# Patient Record
Sex: FEMALE | Race: WHITE | Employment: OTHER | URBAN - METROPOLITAN AREA
[De-identification: names, ages, dates, MRNs, and addresses within clinical notes are randomized per-mention and may not be internally consistent; named-entity substitution may affect disease eponyms.]

---

## 2017-01-01 ENCOUNTER — HOME CARE VISIT (OUTPATIENT)
Dept: HOSPICE | Facility: HOSPICE | Age: 77
End: 2017-01-01
Payer: MEDICARE

## 2017-01-01 ENCOUNTER — HOSPITAL ENCOUNTER (INPATIENT)
Age: 77
LOS: 3 days | DRG: 872 | End: 2017-01-19
Attending: FAMILY MEDICINE | Admitting: FAMILY MEDICINE
Payer: OTHER MISCELLANEOUS

## 2017-01-01 ENCOUNTER — APPOINTMENT (OUTPATIENT)
Dept: CT IMAGING | Age: 77
DRG: 871 | End: 2017-01-01
Attending: INTERNAL MEDICINE
Payer: MEDICARE

## 2017-01-01 ENCOUNTER — HOSPITAL ENCOUNTER (INPATIENT)
Age: 77
LOS: 3 days | Discharge: HOSPICE | DRG: 871 | End: 2017-01-16
Attending: EMERGENCY MEDICINE | Admitting: INTERNAL MEDICINE
Payer: MEDICARE

## 2017-01-01 ENCOUNTER — HOSPICE CERTIFICATION ENCOUNTER (OUTPATIENT)
Dept: PALLATIVE CARE | Age: 77
End: 2017-01-01

## 2017-01-01 ENCOUNTER — HOSPICE ADMISSION (OUTPATIENT)
Dept: HOSPICE | Facility: HOSPICE | Age: 77
End: 2017-01-01
Payer: MEDICARE

## 2017-01-01 ENCOUNTER — APPOINTMENT (OUTPATIENT)
Dept: CT IMAGING | Age: 77
DRG: 871 | End: 2017-01-01
Attending: EMERGENCY MEDICINE
Payer: MEDICARE

## 2017-01-01 ENCOUNTER — APPOINTMENT (OUTPATIENT)
Dept: GENERAL RADIOLOGY | Age: 77
DRG: 871 | End: 2017-01-01
Attending: EMERGENCY MEDICINE
Payer: MEDICARE

## 2017-01-01 ENCOUNTER — HOSPITAL ENCOUNTER (OUTPATIENT)
Dept: GENERAL RADIOLOGY | Age: 77
Discharge: HOME | End: 2017-01-13
Attending: PHYSICAL MEDICINE & REHABILITATION
Payer: COMMERCIAL

## 2017-01-01 ENCOUNTER — HOSPITAL ENCOUNTER (OUTPATIENT)
Age: 77
Discharge: ANOTHER HOSPITAL | End: 2017-01-13
Attending: PHYSICAL MEDICINE & REHABILITATION | Admitting: PHYSICAL MEDICINE & REHABILITATION

## 2017-01-01 VITALS
TEMPERATURE: 98.7 F | HEART RATE: 100 BPM | SYSTOLIC BLOOD PRESSURE: 58 MMHG | OXYGEN SATURATION: 94 % | DIASTOLIC BLOOD PRESSURE: 42 MMHG | RESPIRATION RATE: 20 BRPM

## 2017-01-01 VITALS
TEMPERATURE: 97.6 F | OXYGEN SATURATION: 100 % | BODY MASS INDEX: 24.9 KG/M2 | RESPIRATION RATE: 20 BRPM | DIASTOLIC BLOOD PRESSURE: 45 MMHG | HEART RATE: 86 BPM | SYSTOLIC BLOOD PRESSURE: 77 MMHG | HEIGHT: 60 IN | WEIGHT: 126.8 LBS

## 2017-01-01 VITALS
DIASTOLIC BLOOD PRESSURE: 72 MMHG | WEIGHT: 107 LBS | HEIGHT: 60 IN | BODY MASS INDEX: 21.01 KG/M2 | SYSTOLIC BLOOD PRESSURE: 112 MMHG | HEART RATE: 100 BPM

## 2017-01-01 DIAGNOSIS — A41.9 SEPSIS, DUE TO UNSPECIFIED ORGANISM: ICD-10-CM

## 2017-01-01 DIAGNOSIS — R53.1 WEAKNESS: ICD-10-CM

## 2017-01-01 DIAGNOSIS — R41.82 ALTERED MENTAL STATUS, UNSPECIFIED: Primary | ICD-10-CM

## 2017-01-01 DIAGNOSIS — R53.81 DEBILITY: ICD-10-CM

## 2017-01-01 LAB
ALBUMIN SERPL BCP-MCNC: 2.5 G/DL (ref 3.5–5)
ALBUMIN SERPL BCP-MCNC: 3.4 G/DL (ref 3.5–5)
ALBUMIN SERPL BCP-MCNC: 3.5 G/DL (ref 3.5–5)
ALBUMIN/GLOB SERPL: 0.7 {RATIO} (ref 1.1–2.2)
ALP SERPL-CCNC: 102 U/L (ref 45–117)
ALP SERPL-CCNC: 179 U/L (ref 45–117)
ALP SERPL-CCNC: 196 U/L (ref 45–117)
ALT SERPL-CCNC: 33 U/L (ref 12–78)
ALT SERPL-CCNC: 54 U/L (ref 12–78)
ALT SERPL-CCNC: 58 U/L (ref 12–78)
ANION GAP BLD CALC-SCNC: 10 MMOL/L (ref 5–15)
ANION GAP BLD CALC-SCNC: 11 MMOL/L (ref 5–15)
ANION GAP BLD CALC-SCNC: 11 MMOL/L (ref 5–15)
ANION GAP BLD CALC-SCNC: 12 MMOL/L (ref 5–15)
ANION GAP BLD CALC-SCNC: 9 MMOL/L (ref 5–15)
APPEARANCE UR: ABNORMAL
AST SERPL W P-5'-P-CCNC: 29 U/L (ref 15–37)
AST SERPL W P-5'-P-CCNC: 33 U/L (ref 15–37)
AST SERPL W P-5'-P-CCNC: 42 U/L (ref 15–37)
ATRIAL RATE: 99 BPM
BACTERIA SPEC CULT: NORMAL
BACTERIA URNS QL MICRO: ABNORMAL /HPF
BASOPHILS # BLD AUTO: 0.1 K/UL (ref 0–0.1)
BASOPHILS # BLD AUTO: 0.1 K/UL (ref 0–0.1)
BASOPHILS # BLD: 0 % (ref 0–1)
BASOPHILS # BLD: 1 % (ref 0–1)
BILIRUB DIRECT SERPL-MCNC: 0.1 MG/DL (ref 0–0.2)
BILIRUB SERPL-MCNC: 0.4 MG/DL (ref 0.2–1)
BILIRUB SERPL-MCNC: 0.5 MG/DL (ref 0.2–1)
BILIRUB SERPL-MCNC: 0.5 MG/DL (ref 0.2–1)
BILIRUB UR QL: NEGATIVE
BNP SERPL-MCNC: 9938 PG/ML (ref 0–450)
BUN SERPL-MCNC: 51 MG/DL (ref 6–20)
BUN SERPL-MCNC: 62 MG/DL (ref 6–20)
BUN SERPL-MCNC: 68 MG/DL (ref 6–20)
BUN SERPL-MCNC: 87 MG/DL (ref 6–20)
BUN SERPL-MCNC: 93 MG/DL (ref 6–20)
BUN/CREAT SERPL: 42 (ref 12–20)
BUN/CREAT SERPL: 48 (ref 12–20)
BUN/CREAT SERPL: 59 (ref 12–20)
BUN/CREAT SERPL: 65 (ref 12–20)
BUN/CREAT SERPL: 69 (ref 12–20)
CALCIUM SERPL-MCNC: 8 MG/DL (ref 8.5–10.1)
CALCIUM SERPL-MCNC: 8.6 MG/DL (ref 8.5–10.1)
CALCIUM SERPL-MCNC: 8.8 MG/DL (ref 8.5–10.1)
CALCIUM SERPL-MCNC: 9.6 MG/DL (ref 8.5–10.1)
CALCIUM SERPL-MCNC: 9.9 MG/DL (ref 8.5–10.1)
CALCULATED P AXIS, ECG09: 63 DEGREES
CALCULATED R AXIS, ECG10: -16 DEGREES
CALCULATED T AXIS, ECG11: 118 DEGREES
CC UR VC: NORMAL
CHLORIDE SERPL-SCNC: 100 MMOL/L (ref 97–108)
CHLORIDE SERPL-SCNC: 103 MMOL/L (ref 97–108)
CHLORIDE SERPL-SCNC: 106 MMOL/L (ref 97–108)
CHLORIDE SERPL-SCNC: 111 MMOL/L (ref 97–108)
CHLORIDE SERPL-SCNC: 112 MMOL/L (ref 97–108)
CO2 SERPL-SCNC: 22 MMOL/L (ref 21–32)
CO2 SERPL-SCNC: 26 MMOL/L (ref 21–32)
CO2 SERPL-SCNC: 27 MMOL/L (ref 21–32)
CO2 SERPL-SCNC: 28 MMOL/L (ref 21–32)
CO2 SERPL-SCNC: 28 MMOL/L (ref 21–32)
COLOR UR: ABNORMAL
CREAT SERPL-MCNC: 1.16 MG/DL (ref 0.55–1.02)
CREAT SERPL-MCNC: 1.21 MG/DL (ref 0.55–1.02)
CREAT SERPL-MCNC: 1.3 MG/DL (ref 0.55–1.02)
CREAT SERPL-MCNC: 1.33 MG/DL (ref 0.55–1.02)
CREAT SERPL-MCNC: 1.35 MG/DL (ref 0.55–1.02)
DIAGNOSIS, 93000: NORMAL
EOSINOPHIL # BLD: 0.1 K/UL (ref 0–0.4)
EOSINOPHIL # BLD: 0.1 K/UL (ref 0–0.4)
EOSINOPHIL NFR BLD: 0 % (ref 0–7)
EOSINOPHIL NFR BLD: 1 % (ref 0–7)
EPITH CASTS URNS QL MICRO: ABNORMAL /LPF
ERYTHROCYTE [DISTWIDTH] IN BLOOD BY AUTOMATED COUNT: 17.5 % (ref 11.5–14.5)
ERYTHROCYTE [DISTWIDTH] IN BLOOD BY AUTOMATED COUNT: 17.5 % (ref 11.5–14.5)
ERYTHROCYTE [DISTWIDTH] IN BLOOD BY AUTOMATED COUNT: 17.6 % (ref 11.5–14.5)
ERYTHROCYTE [DISTWIDTH] IN BLOOD BY AUTOMATED COUNT: 17.6 % (ref 11.5–14.5)
ERYTHROCYTE [DISTWIDTH] IN BLOOD BY AUTOMATED COUNT: 17.8 % (ref 11.5–14.5)
ERYTHROCYTE [DISTWIDTH] IN BLOOD BY AUTOMATED COUNT: 17.8 % (ref 11.5–14.5)
ERYTHROCYTE [SEDIMENTATION RATE] IN BLOOD: 42 MM/HR (ref 0–30)
GLOBULIN SER CALC-MCNC: 3.6 G/DL (ref 2–4)
GLOBULIN SER CALC-MCNC: 4.8 G/DL (ref 2–4)
GLOBULIN SER CALC-MCNC: 5 G/DL (ref 2–4)
GLUCOSE BLD STRIP.AUTO-MCNC: 103 MG/DL (ref 65–100)
GLUCOSE BLD STRIP.AUTO-MCNC: 106 MG/DL (ref 65–100)
GLUCOSE BLD STRIP.AUTO-MCNC: 107 MG/DL (ref 65–100)
GLUCOSE BLD STRIP.AUTO-MCNC: 108 MG/DL (ref 65–100)
GLUCOSE BLD STRIP.AUTO-MCNC: 39 MG/DL (ref 65–100)
GLUCOSE BLD STRIP.AUTO-MCNC: 39 MG/DL (ref 65–100)
GLUCOSE BLD STRIP.AUTO-MCNC: 71 MG/DL (ref 65–100)
GLUCOSE BLD STRIP.AUTO-MCNC: 73 MG/DL (ref 65–100)
GLUCOSE BLD STRIP.AUTO-MCNC: 78 MG/DL (ref 65–100)
GLUCOSE BLD STRIP.AUTO-MCNC: 82 MG/DL (ref 65–100)
GLUCOSE BLD STRIP.AUTO-MCNC: 90 MG/DL (ref 65–100)
GLUCOSE BLD STRIP.AUTO-MCNC: 94 MG/DL (ref 65–100)
GLUCOSE BLD STRIP.AUTO-MCNC: 98 MG/DL (ref 65–100)
GLUCOSE BLD STRIP.AUTO-MCNC: 99 MG/DL (ref 65–100)
GLUCOSE SERPL-MCNC: 252 MG/DL (ref 65–100)
GLUCOSE SERPL-MCNC: 85 MG/DL (ref 65–100)
GLUCOSE SERPL-MCNC: 87 MG/DL (ref 65–100)
GLUCOSE SERPL-MCNC: 90 MG/DL (ref 65–100)
GLUCOSE SERPL-MCNC: 92 MG/DL (ref 65–100)
GLUCOSE UR STRIP.AUTO-MCNC: NEGATIVE MG/DL
HCT VFR BLD AUTO: 33 % (ref 35–47)
HCT VFR BLD AUTO: 35.7 % (ref 35–47)
HCT VFR BLD AUTO: 37 % (ref 35–47)
HCT VFR BLD AUTO: 39.7 % (ref 35–47)
HCT VFR BLD AUTO: 40.6 % (ref 35–47)
HCT VFR BLD AUTO: 40.8 % (ref 35–47)
HGB BLD-MCNC: 10.4 G/DL (ref 11.5–16)
HGB BLD-MCNC: 11 G/DL (ref 11.5–16)
HGB BLD-MCNC: 12 G/DL (ref 11.5–16)
HGB BLD-MCNC: 12.3 G/DL (ref 11.5–16)
HGB BLD-MCNC: 12.3 G/DL (ref 11.5–16)
HGB BLD-MCNC: 9.8 G/DL (ref 11.5–16)
HGB UR QL STRIP: NEGATIVE
KETONES UR QL STRIP.AUTO: NEGATIVE MG/DL
LACTATE SERPL-SCNC: 1.5 MMOL/L (ref 0.4–2)
LACTATE SERPL-SCNC: 2 MMOL/L (ref 0.4–2)
LACTATE SERPL-SCNC: 2 MMOL/L (ref 0.4–2)
LACTATE SERPL-SCNC: 2.3 MMOL/L (ref 0.4–2)
LACTATE SERPL-SCNC: 2.3 MMOL/L (ref 0.4–2)
LACTATE SERPL-SCNC: 2.4 MMOL/L (ref 0.4–2)
LACTATE SERPL-SCNC: 3.3 MMOL/L (ref 0.4–2)
LEUKOCYTE ESTERASE UR QL STRIP.AUTO: ABNORMAL
LYMPHOCYTES # BLD AUTO: 12 % (ref 12–49)
LYMPHOCYTES # BLD AUTO: 9 % (ref 12–49)
LYMPHOCYTES # BLD: 1.5 K/UL (ref 0.8–3.5)
LYMPHOCYTES # BLD: 1.9 K/UL (ref 0.8–3.5)
MAGNESIUM SERPL-MCNC: 1.9 MG/DL (ref 1.6–2.4)
MAGNESIUM SERPL-MCNC: 2.2 MG/DL (ref 1.6–2.4)
MAGNESIUM SERPL-MCNC: 2.2 MG/DL (ref 1.6–2.4)
MAGNESIUM SERPL-MCNC: 2.8 MG/DL (ref 1.6–2.4)
MCH RBC QN AUTO: 25.8 PG (ref 26–34)
MCH RBC QN AUTO: 26 PG (ref 26–34)
MCH RBC QN AUTO: 26.3 PG (ref 26–34)
MCH RBC QN AUTO: 26.4 PG (ref 26–34)
MCH RBC QN AUTO: 26.5 PG (ref 26–34)
MCH RBC QN AUTO: 26.6 PG (ref 26–34)
MCHC RBC AUTO-ENTMCNC: 29.1 G/DL (ref 30–36.5)
MCHC RBC AUTO-ENTMCNC: 29.7 G/DL (ref 30–36.5)
MCHC RBC AUTO-ENTMCNC: 29.7 G/DL (ref 30–36.5)
MCHC RBC AUTO-ENTMCNC: 30.1 G/DL (ref 30–36.5)
MCHC RBC AUTO-ENTMCNC: 30.2 G/DL (ref 30–36.5)
MCHC RBC AUTO-ENTMCNC: 30.3 G/DL (ref 30–36.5)
MCV RBC AUTO: 87.1 FL (ref 80–99)
MCV RBC AUTO: 87.5 FL (ref 80–99)
MCV RBC AUTO: 87.9 FL (ref 80–99)
MCV RBC AUTO: 87.9 FL (ref 80–99)
MCV RBC AUTO: 88.6 FL (ref 80–99)
MCV RBC AUTO: 88.9 FL (ref 80–99)
MONOCYTES # BLD: 1.3 K/UL (ref 0–1)
MONOCYTES # BLD: 1.4 K/UL (ref 0–1)
MONOCYTES NFR BLD AUTO: 8 % (ref 5–13)
MONOCYTES NFR BLD AUTO: 8 % (ref 5–13)
NEUTS SEG # BLD: 12.9 K/UL (ref 1.8–8)
NEUTS SEG # BLD: 14.1 K/UL (ref 1.8–8)
NEUTS SEG NFR BLD AUTO: 79 % (ref 32–75)
NEUTS SEG NFR BLD AUTO: 82 % (ref 32–75)
NITRITE UR QL STRIP.AUTO: NEGATIVE
P-R INTERVAL, ECG05: 136 MS
PH UR STRIP: 7 [PH] (ref 5–8)
PHOSPHATE SERPL-MCNC: 3.5 MG/DL (ref 2.6–4.7)
PHOSPHATE SERPL-MCNC: 4.2 MG/DL (ref 2.6–4.7)
PHOSPHATE SERPL-MCNC: 4.8 MG/DL (ref 2.6–4.7)
PLATELET # BLD AUTO: 622 K/UL (ref 150–400)
PLATELET # BLD AUTO: 676 K/UL (ref 150–400)
PLATELET # BLD AUTO: 718 K/UL (ref 150–400)
PLATELET # BLD AUTO: 805 K/UL (ref 150–400)
PLATELET # BLD AUTO: 805 K/UL (ref 150–400)
PLATELET # BLD AUTO: 854 K/UL (ref 150–400)
POTASSIUM SERPL-SCNC: 2.6 MMOL/L (ref 3.5–5.1)
POTASSIUM SERPL-SCNC: 2.9 MMOL/L (ref 3.5–5.1)
POTASSIUM SERPL-SCNC: 3.2 MMOL/L (ref 3.5–5.1)
POTASSIUM SERPL-SCNC: 4.8 MMOL/L (ref 3.5–5.1)
POTASSIUM SERPL-SCNC: 6 MMOL/L (ref 3.5–5.1)
POTASSIUM SERPL-SCNC: 6.7 MMOL/L (ref 3.5–5.1)
PROT SERPL-MCNC: 6.1 G/DL (ref 6.4–8.2)
PROT SERPL-MCNC: 8.2 G/DL (ref 6.4–8.2)
PROT SERPL-MCNC: 8.5 G/DL (ref 6.4–8.2)
PROT UR STRIP-MCNC: NEGATIVE MG/DL
Q-T INTERVAL, ECG07: 344 MS
QRS DURATION, ECG06: 74 MS
QTC CALCULATION (BEZET), ECG08: 441 MS
RBC # BLD AUTO: 3.77 M/UL (ref 3.8–5.2)
RBC # BLD AUTO: 4.03 M/UL (ref 3.8–5.2)
RBC # BLD AUTO: 4.16 M/UL (ref 3.8–5.2)
RBC # BLD AUTO: 4.56 M/UL (ref 3.8–5.2)
RBC # BLD AUTO: 4.62 M/UL (ref 3.8–5.2)
RBC # BLD AUTO: 4.64 M/UL (ref 3.8–5.2)
RBC #/AREA URNS HPF: ABNORMAL /HPF (ref 0–5)
SERVICE CMNT-IMP: ABNORMAL
SERVICE CMNT-IMP: NORMAL
SODIUM SERPL-SCNC: 137 MMOL/L (ref 136–145)
SODIUM SERPL-SCNC: 139 MMOL/L (ref 136–145)
SODIUM SERPL-SCNC: 140 MMOL/L (ref 136–145)
SODIUM SERPL-SCNC: 148 MMOL/L (ref 136–145)
SODIUM SERPL-SCNC: 152 MMOL/L (ref 136–145)
SP GR UR REFRACTOMETRY: 1.02 (ref 1–1.03)
UROBILINOGEN UR QL STRIP.AUTO: 0.2 EU/DL (ref 0.2–1)
VENTRICULAR RATE, ECG03: 99 BPM
WBC # BLD AUTO: 13.4 K/UL (ref 3.6–11)
WBC # BLD AUTO: 14 K/UL (ref 3.6–11)
WBC # BLD AUTO: 16.4 K/UL (ref 3.6–11)
WBC # BLD AUTO: 17 K/UL (ref 3.6–11)
WBC # BLD AUTO: 18.8 K/UL (ref 3.6–11)
WBC # BLD AUTO: 18.9 K/UL (ref 3.6–11)
WBC URNS QL MICRO: ABNORMAL /HPF (ref 0–4)
YEAST BUDDING URNS QL: PRESENT

## 2017-01-01 PROCEDURE — G0299 HHS/HOSPICE OF RN EA 15 MIN: HCPCS

## 2017-01-01 PROCEDURE — 83735 ASSAY OF MAGNESIUM: CPT | Performed by: PHYSICAL MEDICINE & REHABILITATION

## 2017-01-01 PROCEDURE — 77010033678 HC OXYGEN DAILY

## 2017-01-01 PROCEDURE — 74011250637 HC RX REV CODE- 250/637: Performed by: PHYSICAL MEDICINE & REHABILITATION

## 2017-01-01 PROCEDURE — 74011636637 HC RX REV CODE- 636/637: Performed by: INTERNAL MEDICINE

## 2017-01-01 PROCEDURE — 85652 RBC SED RATE AUTOMATED: CPT | Performed by: PHYSICAL MEDICINE & REHABILITATION

## 2017-01-01 PROCEDURE — 83605 ASSAY OF LACTIC ACID: CPT | Performed by: INTERNAL MEDICINE

## 2017-01-01 PROCEDURE — 87040 BLOOD CULTURE FOR BACTERIA: CPT | Performed by: PHYSICAL MEDICINE & REHABILITATION

## 2017-01-01 PROCEDURE — 74011250637 HC RX REV CODE- 250/637: Performed by: NURSE PRACTITIONER

## 2017-01-01 PROCEDURE — 0651 HSPC ROUTINE HOME CARE

## 2017-01-01 PROCEDURE — 74011250637 HC RX REV CODE- 250/637: Performed by: INTERNAL MEDICINE

## 2017-01-01 PROCEDURE — 71020 XR CHEST PA LAT: CPT

## 2017-01-01 PROCEDURE — 97530 THERAPEUTIC ACTIVITIES: CPT

## 2017-01-01 PROCEDURE — 81001 URINALYSIS AUTO W/SCOPE: CPT | Performed by: PHYSICAL MEDICINE & REHABILITATION

## 2017-01-01 PROCEDURE — 74011250636 HC RX REV CODE- 250/636: Performed by: INTERNAL MEDICINE

## 2017-01-01 PROCEDURE — 80053 COMPREHEN METABOLIC PANEL: CPT | Performed by: PHYSICAL MEDICINE & REHABILITATION

## 2017-01-01 PROCEDURE — 83605 ASSAY OF LACTIC ACID: CPT | Performed by: PHYSICAL MEDICINE & REHABILITATION

## 2017-01-01 PROCEDURE — 70450 CT HEAD/BRAIN W/O DYE: CPT

## 2017-01-01 PROCEDURE — 97163 PT EVAL HIGH COMPLEX 45 MIN: CPT

## 2017-01-01 PROCEDURE — 80053 COMPREHEN METABOLIC PANEL: CPT | Performed by: EMERGENCY MEDICINE

## 2017-01-01 PROCEDURE — 74011000258 HC RX REV CODE- 258: Performed by: EMERGENCY MEDICINE

## 2017-01-01 PROCEDURE — 36415 COLL VENOUS BLD VENIPUNCTURE: CPT | Performed by: INTERNAL MEDICINE

## 2017-01-01 PROCEDURE — 74011250636 HC RX REV CODE- 250/636: Performed by: NURSE PRACTITIONER

## 2017-01-01 PROCEDURE — 99285 EMERGENCY DEPT VISIT HI MDM: CPT

## 2017-01-01 PROCEDURE — 3336500001 HSPC ELECTION

## 2017-01-01 PROCEDURE — 83605 ASSAY OF LACTIC ACID: CPT | Performed by: EMERGENCY MEDICINE

## 2017-01-01 PROCEDURE — 82962 GLUCOSE BLOOD TEST: CPT

## 2017-01-01 PROCEDURE — 74011000250 HC RX REV CODE- 250: Performed by: INTERNAL MEDICINE

## 2017-01-01 PROCEDURE — 84100 ASSAY OF PHOSPHORUS: CPT | Performed by: INTERNAL MEDICINE

## 2017-01-01 PROCEDURE — 83735 ASSAY OF MAGNESIUM: CPT | Performed by: INTERNAL MEDICINE

## 2017-01-01 PROCEDURE — 87077 CULTURE AEROBIC IDENTIFY: CPT | Performed by: PHYSICAL MEDICINE & REHABILITATION

## 2017-01-01 PROCEDURE — 65270000029 HC RM PRIVATE

## 2017-01-01 PROCEDURE — 97116 GAIT TRAINING THERAPY: CPT

## 2017-01-01 PROCEDURE — 85027 COMPLETE CBC AUTOMATED: CPT | Performed by: INTERNAL MEDICINE

## 2017-01-01 PROCEDURE — 97110 THERAPEUTIC EXERCISES: CPT

## 2017-01-01 PROCEDURE — 74011636637 HC RX REV CODE- 636/637: Performed by: PHYSICAL MEDICINE & REHABILITATION

## 2017-01-01 PROCEDURE — 97110 THERAPEUTIC EXERCISES: CPT | Performed by: OCCUPATIONAL THERAPIST

## 2017-01-01 PROCEDURE — 36415 COLL VENOUS BLD VENIPUNCTURE: CPT | Performed by: PHYSICAL MEDICINE & REHABILITATION

## 2017-01-01 PROCEDURE — 74011000250 HC RX REV CODE- 250: Performed by: NURSE PRACTITIONER

## 2017-01-01 PROCEDURE — 97535 SELF CARE MNGMENT TRAINING: CPT | Performed by: OCCUPATIONAL THERAPIST

## 2017-01-01 PROCEDURE — 97165 OT EVAL LOW COMPLEX 30 MIN: CPT | Performed by: OCCUPATIONAL THERAPIST

## 2017-01-01 PROCEDURE — 85027 COMPLETE CBC AUTOMATED: CPT | Performed by: PHYSICAL MEDICINE & REHABILITATION

## 2017-01-01 PROCEDURE — 77030034848

## 2017-01-01 PROCEDURE — 74011000258 HC RX REV CODE- 258: Performed by: INTERNAL MEDICINE

## 2017-01-01 PROCEDURE — 76450000000

## 2017-01-01 PROCEDURE — 51702 INSERT TEMP BLADDER CATH: CPT

## 2017-01-01 PROCEDURE — 80048 BASIC METABOLIC PNL TOTAL CA: CPT | Performed by: INTERNAL MEDICINE

## 2017-01-01 PROCEDURE — 74011636320 HC RX REV CODE- 636/320: Performed by: RADIOLOGY

## 2017-01-01 PROCEDURE — A9270 NON-COVERED ITEM OR SERVICE: HCPCS | Performed by: INTERNAL MEDICINE

## 2017-01-01 PROCEDURE — 80076 HEPATIC FUNCTION PANEL: CPT | Performed by: INTERNAL MEDICINE

## 2017-01-01 PROCEDURE — 74176 CT ABD & PELVIS W/O CONTRAST: CPT

## 2017-01-01 PROCEDURE — 85025 COMPLETE CBC W/AUTO DIFF WBC: CPT | Performed by: PHYSICAL MEDICINE & REHABILITATION

## 2017-01-01 PROCEDURE — 83880 ASSAY OF NATRIURETIC PEPTIDE: CPT | Performed by: PHYSICAL MEDICINE & REHABILITATION

## 2017-01-01 PROCEDURE — 77030027138 HC INCENT SPIROMETER -A

## 2017-01-01 PROCEDURE — 65660000000 HC RM CCU STEPDOWN

## 2017-01-01 PROCEDURE — 96360 HYDRATION IV INFUSION INIT: CPT

## 2017-01-01 PROCEDURE — 74011250636 HC RX REV CODE- 250/636: Performed by: EMERGENCY MEDICINE

## 2017-01-01 PROCEDURE — 87186 SC STD MICRODIL/AGAR DIL: CPT | Performed by: PHYSICAL MEDICINE & REHABILITATION

## 2017-01-01 PROCEDURE — 36600 WITHDRAWAL OF ARTERIAL BLOOD: CPT

## 2017-01-01 PROCEDURE — 84132 ASSAY OF SERUM POTASSIUM: CPT | Performed by: INTERNAL MEDICINE

## 2017-01-01 PROCEDURE — 87086 URINE CULTURE/COLONY COUNT: CPT | Performed by: PHYSICAL MEDICINE & REHABILITATION

## 2017-01-01 PROCEDURE — 93306 TTE W/DOPPLER COMPLETE: CPT

## 2017-01-01 PROCEDURE — 77030012856

## 2017-01-01 PROCEDURE — 96361 HYDRATE IV INFUSION ADD-ON: CPT

## 2017-01-01 PROCEDURE — 93005 ELECTROCARDIOGRAM TRACING: CPT

## 2017-01-01 PROCEDURE — L4396 STATIC OR DYNAMI AFO PRE CST: HCPCS

## 2017-01-01 RX ORDER — ESCITALOPRAM OXALATE 10 MG/1
20 TABLET ORAL DAILY
Status: DISCONTINUED | OUTPATIENT
Start: 2017-01-01 | End: 2017-01-01 | Stop reason: HOSPADM

## 2017-01-01 RX ORDER — SODIUM CHLORIDE 0.9 % (FLUSH) 0.9 %
5-10 SYRINGE (ML) INJECTION AS NEEDED
Status: DISCONTINUED | OUTPATIENT
Start: 2017-01-01 | End: 2017-01-01 | Stop reason: HOSPADM

## 2017-01-01 RX ORDER — MORPHINE SULFATE 20 MG/ML
5 SOLUTION ORAL
Status: DISCONTINUED | OUTPATIENT
Start: 2017-01-01 | End: 2017-01-01 | Stop reason: HOSPADM

## 2017-01-01 RX ORDER — FLUTICASONE PROPIONATE 50 MCG
2 SPRAY, SUSPENSION (ML) NASAL DAILY
Status: DISCONTINUED | OUTPATIENT
Start: 2017-01-01 | End: 2017-01-01 | Stop reason: HOSPADM

## 2017-01-01 RX ORDER — ESCITALOPRAM OXALATE 10 MG/1
20 TABLET ORAL DAILY
Status: DISCONTINUED | OUTPATIENT
Start: 2017-01-01 | End: 2017-01-01

## 2017-01-01 RX ORDER — ASPIRIN 81 MG/1
81 TABLET ORAL DAILY
Status: DISCONTINUED | OUTPATIENT
Start: 2017-01-01 | End: 2017-01-01

## 2017-01-01 RX ORDER — GUAIFENESIN 100 MG/5ML
81 LIQUID (ML) ORAL DAILY
Status: DISCONTINUED | OUTPATIENT
Start: 2017-01-01 | End: 2017-01-01 | Stop reason: HOSPADM

## 2017-01-01 RX ORDER — PANTOPRAZOLE SODIUM 40 MG/1
40 TABLET, DELAYED RELEASE ORAL
COMMUNITY
End: 2017-01-01

## 2017-01-01 RX ORDER — ACETAMINOPHEN 325 MG/1
650 TABLET ORAL
COMMUNITY

## 2017-01-01 RX ORDER — LORAZEPAM 2 MG/ML
0.5 INJECTION INTRAMUSCULAR
Status: DISCONTINUED | OUTPATIENT
Start: 2017-01-01 | End: 2017-01-01

## 2017-01-01 RX ORDER — OXYCODONE AND ACETAMINOPHEN 5; 325 MG/1; MG/1
2 TABLET ORAL
Status: DISCONTINUED | OUTPATIENT
Start: 2017-01-01 | End: 2017-01-01

## 2017-01-01 RX ORDER — LINEZOLID 2 MG/ML
600 INJECTION, SOLUTION INTRAVENOUS EVERY 12 HOURS
Status: DISCONTINUED | OUTPATIENT
Start: 2017-01-01 | End: 2017-01-01

## 2017-01-01 RX ORDER — POTASSIUM CHLORIDE 750 MG/1
40 TABLET, FILM COATED, EXTENDED RELEASE ORAL 3 TIMES DAILY
Status: DISCONTINUED | OUTPATIENT
Start: 2017-01-01 | End: 2017-01-01

## 2017-01-01 RX ORDER — DEXTROSE AND POTASSIUM CHLORIDE 5; .15 G/100ML; G/100ML
SOLUTION INTRAVENOUS CONTINUOUS
Status: DISCONTINUED | OUTPATIENT
Start: 2017-01-01 | End: 2017-01-01

## 2017-01-01 RX ORDER — SIMVASTATIN 20 MG/1
20 TABLET, FILM COATED ORAL
Status: DISCONTINUED | OUTPATIENT
Start: 2017-01-01 | End: 2017-01-01 | Stop reason: HOSPADM

## 2017-01-01 RX ORDER — IPRATROPIUM BROMIDE AND ALBUTEROL SULFATE 2.5; .5 MG/3ML; MG/3ML
3 SOLUTION RESPIRATORY (INHALATION)
Status: DISCONTINUED | OUTPATIENT
Start: 2017-01-01 | End: 2017-01-01 | Stop reason: HOSPADM

## 2017-01-01 RX ORDER — SODIUM CHLORIDE 9 MG/ML
50 INJECTION, SOLUTION INTRAVENOUS CONTINUOUS
Status: DISCONTINUED | OUTPATIENT
Start: 2017-01-01 | End: 2017-01-01

## 2017-01-01 RX ORDER — POTASSIUM CHLORIDE 750 MG/1
40 TABLET, FILM COATED, EXTENDED RELEASE ORAL ONCE
Status: COMPLETED | OUTPATIENT
Start: 2017-01-01 | End: 2017-01-01

## 2017-01-01 RX ORDER — DEXTROSE MONOHYDRATE AND SODIUM CHLORIDE 5; .9 G/100ML; G/100ML
75 INJECTION, SOLUTION INTRAVENOUS CONTINUOUS
Status: DISCONTINUED | OUTPATIENT
Start: 2017-01-01 | End: 2017-01-01

## 2017-01-01 RX ORDER — HYDROMORPHONE HYDROCHLORIDE 1 MG/ML
0.5 INJECTION, SOLUTION INTRAMUSCULAR; INTRAVENOUS; SUBCUTANEOUS
Status: DISCONTINUED | OUTPATIENT
Start: 2017-01-01 | End: 2017-01-01 | Stop reason: HOSPADM

## 2017-01-01 RX ORDER — METHYLPHENIDATE HYDROCHLORIDE 5 MG/1
10 TABLET ORAL DAILY
Status: DISCONTINUED | OUTPATIENT
Start: 2017-01-01 | End: 2017-01-01

## 2017-01-01 RX ORDER — ONDANSETRON 2 MG/ML
4 INJECTION INTRAMUSCULAR; INTRAVENOUS
Status: DISCONTINUED | OUTPATIENT
Start: 2017-01-01 | End: 2017-01-01 | Stop reason: HOSPADM

## 2017-01-01 RX ORDER — FACIAL-BODY WIPES
10 EACH TOPICAL DAILY PRN
Status: DISCONTINUED | OUTPATIENT
Start: 2017-01-01 | End: 2017-01-01 | Stop reason: HOSPADM

## 2017-01-01 RX ORDER — METOLAZONE 5 MG/1
5 TABLET ORAL
Status: DISCONTINUED | OUTPATIENT
Start: 2017-01-01 | End: 2017-01-01 | Stop reason: HOSPADM

## 2017-01-01 RX ORDER — HEPARIN SODIUM 5000 [USP'U]/ML
5000 INJECTION, SOLUTION INTRAVENOUS; SUBCUTANEOUS EVERY 8 HOURS
Status: DISCONTINUED | OUTPATIENT
Start: 2017-01-01 | End: 2017-01-01

## 2017-01-01 RX ORDER — LEVOTHYROXINE SODIUM 50 UG/1
50 TABLET ORAL
Status: DISCONTINUED | OUTPATIENT
Start: 2017-01-01 | End: 2017-01-01 | Stop reason: HOSPADM

## 2017-01-01 RX ORDER — NYSTATIN 100000 U/G
OINTMENT TOPICAL 3 TIMES DAILY
Status: DISCONTINUED | OUTPATIENT
Start: 2017-01-01 | End: 2017-01-01 | Stop reason: HOSPADM

## 2017-01-01 RX ORDER — SODIUM CHLORIDE 0.9 % (FLUSH) 0.9 %
5-10 SYRINGE (ML) INJECTION EVERY 8 HOURS
Status: DISCONTINUED | OUTPATIENT
Start: 2017-01-01 | End: 2017-01-01 | Stop reason: HOSPADM

## 2017-01-01 RX ORDER — INSULIN LISPRO 100 [IU]/ML
INJECTION, SOLUTION INTRAVENOUS; SUBCUTANEOUS
Status: DISCONTINUED | OUTPATIENT
Start: 2017-01-01 | End: 2017-01-01 | Stop reason: HOSPADM

## 2017-01-01 RX ORDER — ACETAMINOPHEN 650 MG/1
650 SUPPOSITORY RECTAL
Status: DISCONTINUED | OUTPATIENT
Start: 2017-01-01 | End: 2017-01-01 | Stop reason: HOSPADM

## 2017-01-01 RX ORDER — LEVOTHYROXINE SODIUM 50 UG/1
50 TABLET ORAL
Status: DISCONTINUED | OUTPATIENT
Start: 2017-01-01 | End: 2017-01-01

## 2017-01-01 RX ORDER — NITROGLYCERIN 0.4 MG/1
0.4 TABLET SUBLINGUAL
Status: DISCONTINUED | OUTPATIENT
Start: 2017-01-01 | End: 2017-01-01 | Stop reason: HOSPADM

## 2017-01-01 RX ORDER — MAGNESIUM SULFATE 100 %
16 CRYSTALS MISCELLANEOUS AS NEEDED
Status: DISCONTINUED | OUTPATIENT
Start: 2017-01-01 | End: 2017-01-01 | Stop reason: HOSPADM

## 2017-01-01 RX ORDER — MORPHINE SULFATE 20 MG/ML
5 SOLUTION ORAL EVERY 4 HOURS
Status: DISCONTINUED | OUTPATIENT
Start: 2017-01-01 | End: 2017-01-01 | Stop reason: HOSPADM

## 2017-01-01 RX ORDER — OXYCODONE AND ACETAMINOPHEN 5; 325 MG/1; MG/1
1 TABLET ORAL
Status: DISCONTINUED | OUTPATIENT
Start: 2017-01-01 | End: 2017-01-01

## 2017-01-01 RX ORDER — LORAZEPAM 2 MG/ML
0.5 CONCENTRATE ORAL
Status: DISCONTINUED | OUTPATIENT
Start: 2017-01-01 | End: 2017-01-01 | Stop reason: HOSPADM

## 2017-01-01 RX ORDER — POTASSIUM CHLORIDE 1.5 G/1.77G
20 POWDER, FOR SOLUTION ORAL
Status: COMPLETED | OUTPATIENT
Start: 2017-01-01 | End: 2017-01-01

## 2017-01-01 RX ORDER — DEXTROSE 50 % IN WATER (D50W) INTRAVENOUS SYRINGE
25 AS NEEDED
Status: DISCONTINUED | OUTPATIENT
Start: 2017-01-01 | End: 2017-01-01 | Stop reason: HOSPADM

## 2017-01-01 RX ORDER — SIMVASTATIN 20 MG/1
20 TABLET, FILM COATED ORAL
Status: DISCONTINUED | OUTPATIENT
Start: 2017-01-01 | End: 2017-01-01

## 2017-01-01 RX ORDER — METHYLPHENIDATE HYDROCHLORIDE 5 MG/1
10 TABLET ORAL DAILY
Status: DISCONTINUED | OUTPATIENT
Start: 2017-01-01 | End: 2017-01-01 | Stop reason: HOSPADM

## 2017-01-01 RX ORDER — THERA TABS 400 MCG
1 TAB ORAL DAILY
Status: DISCONTINUED | OUTPATIENT
Start: 2017-01-01 | End: 2017-01-01 | Stop reason: HOSPADM

## 2017-01-01 RX ORDER — SPIRONOLACTONE 25 MG/1
50 TABLET ORAL DAILY
Status: DISCONTINUED | OUTPATIENT
Start: 2017-01-01 | End: 2017-01-01 | Stop reason: HOSPADM

## 2017-01-01 RX ORDER — ACETAMINOPHEN 325 MG/1
650 TABLET ORAL
Status: DISCONTINUED | OUTPATIENT
Start: 2017-01-01 | End: 2017-01-01 | Stop reason: HOSPADM

## 2017-01-01 RX ORDER — OMEPRAZOLE 20 MG/1
20 CAPSULE, DELAYED RELEASE ORAL
COMMUNITY

## 2017-01-01 RX ORDER — ADHESIVE BANDAGE
30 BANDAGE TOPICAL DAILY PRN
Status: DISCONTINUED | OUTPATIENT
Start: 2017-01-01 | End: 2017-01-01 | Stop reason: HOSPADM

## 2017-01-01 RX ORDER — OXYCODONE AND ACETAMINOPHEN 5; 325 MG/1; MG/1
1 TABLET ORAL
Status: DISCONTINUED | OUTPATIENT
Start: 2017-01-01 | End: 2017-01-01 | Stop reason: HOSPADM

## 2017-01-01 RX ORDER — THERA TABS 400 MCG
1 TAB ORAL DAILY
Status: DISCONTINUED | OUTPATIENT
Start: 2017-01-01 | End: 2017-01-01

## 2017-01-01 RX ORDER — HYDROMORPHONE HYDROCHLORIDE 1 MG/ML
1 INJECTION, SOLUTION INTRAMUSCULAR; INTRAVENOUS; SUBCUTANEOUS ONCE
Status: COMPLETED | OUTPATIENT
Start: 2017-01-01 | End: 2017-01-01

## 2017-01-01 RX ORDER — PANTOPRAZOLE SODIUM 40 MG/1
40 TABLET, DELAYED RELEASE ORAL
Status: DISCONTINUED | OUTPATIENT
Start: 2017-01-01 | End: 2017-01-01 | Stop reason: HOSPADM

## 2017-01-01 RX ORDER — HYDROMORPHONE HYDROCHLORIDE 2 MG/ML
0.5 INJECTION, SOLUTION INTRAMUSCULAR; INTRAVENOUS; SUBCUTANEOUS
Status: DISCONTINUED | OUTPATIENT
Start: 2017-01-01 | End: 2017-01-01

## 2017-01-01 RX ORDER — SODIUM CHLORIDE 0.9 % (FLUSH) 0.9 %
5 SYRINGE (ML) INJECTION AS NEEDED
Status: DISCONTINUED | OUTPATIENT
Start: 2017-01-01 | End: 2017-01-01

## 2017-01-01 RX ORDER — HYDROMORPHONE HYDROCHLORIDE 1 MG/ML
0.5 INJECTION, SOLUTION INTRAMUSCULAR; INTRAVENOUS; SUBCUTANEOUS
Status: DISCONTINUED | OUTPATIENT
Start: 2017-01-01 | End: 2017-01-01

## 2017-01-01 RX ORDER — NYSTATIN 100000 U/G
CREAM TOPICAL 3 TIMES DAILY
Status: DISCONTINUED | OUTPATIENT
Start: 2017-01-01 | End: 2017-01-01 | Stop reason: HOSPADM

## 2017-01-01 RX ORDER — AMOXICILLIN 250 MG
1 CAPSULE ORAL 2 TIMES DAILY
Status: DISCONTINUED | OUTPATIENT
Start: 2017-01-01 | End: 2017-01-01

## 2017-01-01 RX ORDER — ATROPINE SULFATE 10 MG/ML
3 SOLUTION/ DROPS OPHTHALMIC
Status: DISCONTINUED | OUTPATIENT
Start: 2017-01-01 | End: 2017-01-01 | Stop reason: HOSPADM

## 2017-01-01 RX ORDER — LORAZEPAM 2 MG/ML
0.5 CONCENTRATE ORAL EVERY 4 HOURS
Status: DISCONTINUED | OUTPATIENT
Start: 2017-01-01 | End: 2017-01-01 | Stop reason: HOSPADM

## 2017-01-01 RX ORDER — DEXTROSE, SODIUM CHLORIDE, AND POTASSIUM CHLORIDE 5; .45; .15 G/100ML; G/100ML; G/100ML
75 INJECTION INTRAVENOUS CONTINUOUS
Status: DISCONTINUED | OUTPATIENT
Start: 2017-01-01 | End: 2017-01-01

## 2017-01-01 RX ORDER — MIDODRINE HYDROCHLORIDE 5 MG/1
10 TABLET ORAL 3 TIMES DAILY
Status: DISCONTINUED | OUTPATIENT
Start: 2017-01-01 | End: 2017-01-01 | Stop reason: HOSPADM

## 2017-01-01 RX ORDER — BUMETANIDE 1 MG/1
2 TABLET ORAL 2 TIMES DAILY
Status: DISCONTINUED | OUTPATIENT
Start: 2017-01-01 | End: 2017-01-01 | Stop reason: HOSPADM

## 2017-01-01 RX ORDER — OXYCODONE AND ACETAMINOPHEN 5; 325 MG/1; MG/1
1-2 TABLET ORAL
Status: DISCONTINUED | OUTPATIENT
Start: 2017-01-01 | End: 2017-01-01 | Stop reason: SDUPTHER

## 2017-01-01 RX ORDER — HYDROMORPHONE HYDROCHLORIDE 2 MG/ML
1 INJECTION, SOLUTION INTRAMUSCULAR; INTRAVENOUS; SUBCUTANEOUS
Status: DISCONTINUED | OUTPATIENT
Start: 2017-01-01 | End: 2017-01-01

## 2017-01-01 RX ORDER — INSULIN GLARGINE 100 [IU]/ML
15 INJECTION, SOLUTION SUBCUTANEOUS DAILY
Status: DISCONTINUED | OUTPATIENT
Start: 2017-01-01 | End: 2017-01-01

## 2017-01-01 RX ORDER — HYDROMORPHONE HYDROCHLORIDE 2 MG/ML
0.2 INJECTION, SOLUTION INTRAMUSCULAR; INTRAVENOUS; SUBCUTANEOUS
Status: DISCONTINUED | OUTPATIENT
Start: 2017-01-01 | End: 2017-01-01 | Stop reason: HOSPADM

## 2017-01-01 RX ORDER — MIDODRINE HYDROCHLORIDE 5 MG/1
10 TABLET ORAL 3 TIMES DAILY
Status: DISCONTINUED | OUTPATIENT
Start: 2017-01-01 | End: 2017-01-01

## 2017-01-01 RX ORDER — ENOXAPARIN SODIUM 100 MG/ML
40 INJECTION SUBCUTANEOUS DAILY
Status: DISCONTINUED | OUTPATIENT
Start: 2017-01-01 | End: 2017-01-01 | Stop reason: HOSPADM

## 2017-01-01 RX ORDER — INSULIN LISPRO 100 [IU]/ML
INJECTION, SOLUTION INTRAVENOUS; SUBCUTANEOUS 4 TIMES DAILY
COMMUNITY

## 2017-01-01 RX ORDER — POTASSIUM CHLORIDE 1.5 G/1.77G
20 POWDER, FOR SOLUTION ORAL 2 TIMES DAILY WITH MEALS
Status: COMPLETED | OUTPATIENT
Start: 2017-01-01 | End: 2017-01-01

## 2017-01-01 RX ORDER — METRONIDAZOLE 500 MG/100ML
500 INJECTION, SOLUTION INTRAVENOUS EVERY 6 HOURS
Status: DISCONTINUED | OUTPATIENT
Start: 2017-01-01 | End: 2017-01-01

## 2017-01-01 RX ORDER — DEXTROSE 50 % IN WATER (D50W) INTRAVENOUS SYRINGE
25 ONCE
Status: COMPLETED | OUTPATIENT
Start: 2017-01-01 | End: 2017-01-01

## 2017-01-01 RX ORDER — LORAZEPAM 2 MG/ML
1 INJECTION INTRAMUSCULAR
Status: DISCONTINUED | OUTPATIENT
Start: 2017-01-01 | End: 2017-01-01 | Stop reason: HOSPADM

## 2017-01-01 RX ORDER — OXYCODONE AND ACETAMINOPHEN 5; 325 MG/1; MG/1
2 TABLET ORAL
Status: DISCONTINUED | OUTPATIENT
Start: 2017-01-01 | End: 2017-01-01 | Stop reason: HOSPADM

## 2017-01-01 RX ORDER — AMOXICILLIN 250 MG
1 CAPSULE ORAL 2 TIMES DAILY
Status: DISCONTINUED | OUTPATIENT
Start: 2017-01-01 | End: 2017-01-01 | Stop reason: HOSPADM

## 2017-01-01 RX ADMIN — LORAZEPAM 0.5 MG: 2 SOLUTION, CONCENTRATE ORAL at 22:07

## 2017-01-01 RX ADMIN — LINEZOLID 600 MG: 600 INJECTION, SOLUTION INTRAVENOUS at 11:17

## 2017-01-01 RX ADMIN — OXYCODONE HYDROCHLORIDE AND ACETAMINOPHEN 1 TABLET: 5; 325 TABLET ORAL at 13:16

## 2017-01-01 RX ADMIN — NYSTATIN: 100000 OINTMENT TOPICAL at 15:40

## 2017-01-01 RX ADMIN — POTASSIUM CHLORIDE 20 MEQ: 1.5 POWDER, FOR SOLUTION ORAL at 17:25

## 2017-01-01 RX ADMIN — LORAZEPAM 0.5 MG: 2 SOLUTION, CONCENTRATE ORAL at 21:51

## 2017-01-01 RX ADMIN — NYSTATIN: 100000 CREAM TOPICAL at 22:00

## 2017-01-01 RX ADMIN — METRONIDAZOLE 500 MG: 500 INJECTION, SOLUTION INTRAVENOUS at 14:19

## 2017-01-01 RX ADMIN — LORAZEPAM 0.5 MG: 2 INJECTION INTRAMUSCULAR; INTRAVENOUS at 04:26

## 2017-01-01 RX ADMIN — MIDODRINE HYDROCHLORIDE 10 MG: 5 TABLET ORAL at 15:39

## 2017-01-01 RX ADMIN — DIATRIZOATE MEGLUMINE AND DIATRIZOATE SODIUM 30 ML: 660; 100 LIQUID ORAL; RECTAL at 12:05

## 2017-01-01 RX ADMIN — Medication 10 ML: at 20:20

## 2017-01-01 RX ADMIN — SODIUM CHLORIDE 3.38 G: 900 INJECTION, SOLUTION INTRAVENOUS at 15:26

## 2017-01-01 RX ADMIN — CALCIUM GLUCONATE 2 G: 94 INJECTION, SOLUTION INTRAVENOUS at 16:19

## 2017-01-01 RX ADMIN — LORAZEPAM 0.5 MG: 2 INJECTION INTRAMUSCULAR; INTRAVENOUS at 00:01

## 2017-01-01 RX ADMIN — LORAZEPAM 0.5 MG: 2 SOLUTION, CONCENTRATE ORAL at 18:45

## 2017-01-01 RX ADMIN — Medication 1 TABLET: at 17:45

## 2017-01-01 RX ADMIN — ASPIRIN 81 MG: 81 TABLET, COATED ORAL at 08:15

## 2017-01-01 RX ADMIN — ASPIRIN 81 MG: 81 TABLET, COATED ORAL at 08:25

## 2017-01-01 RX ADMIN — SODIUM POLYSTYRENE SULFONATE 30 G: 15 SUSPENSION ORAL; RECTAL at 08:16

## 2017-01-01 RX ADMIN — OXYCODONE HYDROCHLORIDE AND ACETAMINOPHEN 1 TABLET: 5; 325 TABLET ORAL at 08:25

## 2017-01-01 RX ADMIN — NYSTATIN: 100000 OINTMENT TOPICAL at 10:11

## 2017-01-01 RX ADMIN — MIDODRINE HYDROCHLORIDE 10 MG: 5 TABLET ORAL at 08:36

## 2017-01-01 RX ADMIN — HEPARIN SODIUM 5000 UNITS: 5000 INJECTION, SOLUTION INTRAVENOUS; SUBCUTANEOUS at 15:34

## 2017-01-01 RX ADMIN — Medication 10 ML: at 14:00

## 2017-01-01 RX ADMIN — CEFTRIAXONE 1 G: 1 INJECTION, POWDER, FOR SOLUTION INTRAMUSCULAR; INTRAVENOUS at 09:00

## 2017-01-01 RX ADMIN — Medication 10 ML: at 22:56

## 2017-01-01 RX ADMIN — MORPHINE SULFATE 5 MG: 20 SOLUTION ORAL at 04:20

## 2017-01-01 RX ADMIN — NYSTATIN: 100000 OINTMENT TOPICAL at 07:08

## 2017-01-01 RX ADMIN — DEXTROSE MONOHYDRATE, SODIUM CHLORIDE, AND POTASSIUM CHLORIDE 75 ML/HR: 50; 4.5; 1.49 INJECTION, SOLUTION INTRAVENOUS at 13:11

## 2017-01-01 RX ADMIN — AZTREONAM 1 G: 1 INJECTION, POWDER, LYOPHILIZED, FOR SOLUTION INTRAMUSCULAR; INTRAVENOUS at 10:10

## 2017-01-01 RX ADMIN — POTASSIUM CHLORIDE 40 MEQ: 750 TABLET, FILM COATED, EXTENDED RELEASE ORAL at 13:10

## 2017-01-01 RX ADMIN — INSULIN LISPRO 5 UNITS: 100 INJECTION, SOLUTION INTRAVENOUS; SUBCUTANEOUS at 22:25

## 2017-01-01 RX ADMIN — DEXTROSE MONOHYDRATE, SODIUM CHLORIDE, AND POTASSIUM CHLORIDE 75 ML/HR: 50; 4.5; 1.49 INJECTION, SOLUTION INTRAVENOUS at 02:19

## 2017-01-01 RX ADMIN — Medication 1 TABLET: at 08:15

## 2017-01-01 RX ADMIN — Medication 10 ML: at 04:26

## 2017-01-01 RX ADMIN — NYSTATIN: 100000 OINTMENT TOPICAL at 22:34

## 2017-01-01 RX ADMIN — MORPHINE SULFATE 5 MG: 20 SOLUTION ORAL at 18:07

## 2017-01-01 RX ADMIN — MORPHINE SULFATE 5 MG: 20 SOLUTION ORAL at 14:11

## 2017-01-01 RX ADMIN — INSULIN GLARGINE 15 UNITS: 100 INJECTION, SOLUTION SUBCUTANEOUS at 08:25

## 2017-01-01 RX ADMIN — CEFTRIAXONE 1 G: 1 INJECTION, POWDER, FOR SOLUTION INTRAMUSCULAR; INTRAVENOUS at 10:11

## 2017-01-01 RX ADMIN — MIDODRINE HYDROCHLORIDE 10 MG: 5 TABLET ORAL at 21:52

## 2017-01-01 RX ADMIN — HEPARIN SODIUM 5000 UNITS: 5000 INJECTION, SOLUTION INTRAVENOUS; SUBCUTANEOUS at 15:39

## 2017-01-01 RX ADMIN — INSULIN GLARGINE 15 UNITS: 100 INJECTION, SOLUTION SUBCUTANEOUS at 10:09

## 2017-01-01 RX ADMIN — HYDROMORPHONE HYDROCHLORIDE 0.5 MG: 2 INJECTION INTRAMUSCULAR; INTRAVENOUS; SUBCUTANEOUS at 23:49

## 2017-01-01 RX ADMIN — MORPHINE SULFATE 5 MG: 20 SOLUTION ORAL at 02:00

## 2017-01-01 RX ADMIN — MIDODRINE HYDROCHLORIDE 10 MG: 5 TABLET ORAL at 23:45

## 2017-01-01 RX ADMIN — SIMVASTATIN 20 MG: 20 TABLET, FILM COATED ORAL at 23:49

## 2017-01-01 RX ADMIN — LORAZEPAM 0.5 MG: 2 INJECTION INTRAMUSCULAR; INTRAVENOUS at 08:58

## 2017-01-01 RX ADMIN — INSULIN GLARGINE 15 UNITS: 100 INJECTION, SOLUTION SUBCUTANEOUS at 12:06

## 2017-01-01 RX ADMIN — HUMAN INSULIN 22 UNITS: 100 INJECTION, SUSPENSION SUBCUTANEOUS at 08:56

## 2017-01-01 RX ADMIN — HYDROMORPHONE HYDROCHLORIDE 0.5 MG: 2 INJECTION INTRAMUSCULAR; INTRAVENOUS; SUBCUTANEOUS at 21:17

## 2017-01-01 RX ADMIN — LORAZEPAM 0.5 MG: 2 SOLUTION, CONCENTRATE ORAL at 04:20

## 2017-01-01 RX ADMIN — Medication 10 ML: at 15:27

## 2017-01-01 RX ADMIN — LORAZEPAM 0.5 MG: 2 SOLUTION, CONCENTRATE ORAL at 10:45

## 2017-01-01 RX ADMIN — OXYCODONE HYDROCHLORIDE AND ACETAMINOPHEN 1 TABLET: 5; 325 TABLET ORAL at 21:52

## 2017-01-01 RX ADMIN — NYSTATIN: 100000 OINTMENT TOPICAL at 21:52

## 2017-01-01 RX ADMIN — HYDROMORPHONE HYDROCHLORIDE 0.5 MG: 2 INJECTION INTRAMUSCULAR; INTRAVENOUS; SUBCUTANEOUS at 20:20

## 2017-01-01 RX ADMIN — AZTREONAM 1 G: 1 INJECTION, POWDER, LYOPHILIZED, FOR SOLUTION INTRAMUSCULAR; INTRAVENOUS at 09:52

## 2017-01-01 RX ADMIN — Medication 10 ML: at 15:43

## 2017-01-01 RX ADMIN — Medication 1 TABLET: at 22:22

## 2017-01-01 RX ADMIN — SODIUM CHLORIDE 50 ML/HR: 900 INJECTION, SOLUTION INTRAVENOUS at 16:18

## 2017-01-01 RX ADMIN — Medication 1 TABLET: at 08:36

## 2017-01-01 RX ADMIN — POTASSIUM CHLORIDE 40 MEQ: 750 TABLET, FILM COATED, EXTENDED RELEASE ORAL at 18:19

## 2017-01-01 RX ADMIN — METRONIDAZOLE 500 MG: 500 INJECTION, SOLUTION INTRAVENOUS at 20:20

## 2017-01-01 RX ADMIN — AZTREONAM 1 G: 1 INJECTION, POWDER, LYOPHILIZED, FOR SOLUTION INTRAMUSCULAR; INTRAVENOUS at 02:46

## 2017-01-01 RX ADMIN — NYSTATIN: 100000 CREAM TOPICAL at 09:00

## 2017-01-01 RX ADMIN — HYDROMORPHONE HYDROCHLORIDE 0.5 MG: 1 INJECTION, SOLUTION INTRAMUSCULAR; INTRAVENOUS; SUBCUTANEOUS at 02:34

## 2017-01-01 RX ADMIN — METRONIDAZOLE 500 MG: 500 INJECTION, SOLUTION INTRAVENOUS at 08:19

## 2017-01-01 RX ADMIN — POTASSIUM CHLORIDE 40 MEQ: 750 TABLET, FILM COATED, EXTENDED RELEASE ORAL at 22:22

## 2017-01-01 RX ADMIN — HYDROMORPHONE HYDROCHLORIDE 1 MG: 1 INJECTION, SOLUTION INTRAMUSCULAR; INTRAVENOUS; SUBCUTANEOUS at 13:55

## 2017-01-01 RX ADMIN — LEVOTHYROXINE SODIUM 50 MCG: 0.05 TABLET ORAL at 06:52

## 2017-01-01 RX ADMIN — Medication 10 ML: at 05:23

## 2017-01-01 RX ADMIN — MIDODRINE HYDROCHLORIDE 10 MG: 5 TABLET ORAL at 18:20

## 2017-01-01 RX ADMIN — Medication 1 TABLET: at 08:25

## 2017-01-01 RX ADMIN — MIDODRINE HYDROCHLORIDE 10 MG: 5 TABLET ORAL at 08:24

## 2017-01-01 RX ADMIN — HEPARIN SODIUM 5000 UNITS: 5000 INJECTION, SOLUTION INTRAVENOUS; SUBCUTANEOUS at 15:56

## 2017-01-01 RX ADMIN — POTASSIUM CHLORIDE AND DEXTROSE MONOHYDRATE: 150; 5 INJECTION, SOLUTION INTRAVENOUS at 08:54

## 2017-01-01 RX ADMIN — METHYLPHENIDATE HYDROCHLORIDE 10 MG: 5 TABLET ORAL at 08:24

## 2017-01-01 RX ADMIN — NYSTATIN: 100000 OINTMENT TOPICAL at 23:50

## 2017-01-01 RX ADMIN — LEVOTHYROXINE SODIUM 50 MCG: 50 TABLET ORAL at 08:25

## 2017-01-01 RX ADMIN — ESCITALOPRAM OXALATE 20 MG: 10 TABLET ORAL at 08:36

## 2017-01-01 RX ADMIN — MORPHINE SULFATE 5 MG: 20 SOLUTION ORAL at 01:55

## 2017-01-01 RX ADMIN — BUMETANIDE 2 MG: 1 TABLET ORAL at 18:20

## 2017-01-01 RX ADMIN — MIDODRINE HYDROCHLORIDE 10 MG: 5 TABLET ORAL at 08:16

## 2017-01-01 RX ADMIN — HEPARIN SODIUM 5000 UNITS: 5000 INJECTION, SOLUTION INTRAVENOUS; SUBCUTANEOUS at 23:49

## 2017-01-01 RX ADMIN — DEXTROSE MONOHYDRATE AND SODIUM CHLORIDE 75 ML/HR: 5; .9 INJECTION, SOLUTION INTRAVENOUS at 10:07

## 2017-01-01 RX ADMIN — SODIUM CHLORIDE 1455 ML: 900 INJECTION, SOLUTION INTRAVENOUS at 12:21

## 2017-01-01 RX ADMIN — HYDROMORPHONE HYDROCHLORIDE 0.5 MG: 2 INJECTION INTRAMUSCULAR; INTRAVENOUS; SUBCUTANEOUS at 09:48

## 2017-01-01 RX ADMIN — Medication 10 ML: at 22:01

## 2017-01-01 RX ADMIN — LORAZEPAM 0.5 MG: 2 SOLUTION, CONCENTRATE ORAL at 06:20

## 2017-01-01 RX ADMIN — LORAZEPAM 0.5 MG: 2 SOLUTION, CONCENTRATE ORAL at 18:07

## 2017-01-01 RX ADMIN — THERA TABS 1 TABLET: TAB at 08:36

## 2017-01-01 RX ADMIN — MIDODRINE HYDROCHLORIDE 10 MG: 5 TABLET ORAL at 23:49

## 2017-01-01 RX ADMIN — HUMAN INSULIN 10 UNITS: 100 INJECTION, SOLUTION SUBCUTANEOUS at 15:56

## 2017-01-01 RX ADMIN — HYDROMORPHONE HYDROCHLORIDE 0.5 MG: 1 INJECTION, SOLUTION INTRAMUSCULAR; INTRAVENOUS; SUBCUTANEOUS at 08:58

## 2017-01-01 RX ADMIN — Medication 10 ML: at 06:06

## 2017-01-01 RX ADMIN — PANTOPRAZOLE SODIUM 40 MG: 40 TABLET, DELAYED RELEASE ORAL at 06:52

## 2017-01-01 RX ADMIN — MIDODRINE HYDROCHLORIDE 10 MG: 5 TABLET ORAL at 15:33

## 2017-01-01 RX ADMIN — Medication 1 TABLET: at 17:24

## 2017-01-01 RX ADMIN — ESCITALOPRAM OXALATE 20 MG: 10 TABLET ORAL at 08:25

## 2017-01-01 RX ADMIN — LORAZEPAM 0.5 MG: 2 SOLUTION, CONCENTRATE ORAL at 14:09

## 2017-01-01 RX ADMIN — ATROPINE SULFATE 3 DROP: 10 SOLUTION/ DROPS OPHTHALMIC at 06:47

## 2017-01-01 RX ADMIN — MIDODRINE HYDROCHLORIDE 10 MG: 5 TABLET ORAL at 06:52

## 2017-01-01 RX ADMIN — LORAZEPAM 0.5 MG: 2 SOLUTION, CONCENTRATE ORAL at 14:11

## 2017-01-01 RX ADMIN — METRONIDAZOLE 500 MG: 500 INJECTION, SOLUTION INTRAVENOUS at 03:23

## 2017-01-01 RX ADMIN — MORPHINE SULFATE 5 MG: 20 SOLUTION ORAL at 06:20

## 2017-01-01 RX ADMIN — METHYLPHENIDATE HYDROCHLORIDE 10 MG: 5 TABLET ORAL at 08:16

## 2017-01-01 RX ADMIN — POTASSIUM CHLORIDE 20 MEQ: 1.5 POWDER, FOR SOLUTION ORAL at 08:15

## 2017-01-01 RX ADMIN — METHYLPHENIDATE HYDROCHLORIDE 10 MG: 5 TABLET ORAL at 08:36

## 2017-01-01 RX ADMIN — MIDODRINE HYDROCHLORIDE 10 MG: 5 TABLET ORAL at 22:22

## 2017-01-01 RX ADMIN — AZTREONAM 1 G: 1 INJECTION, POWDER, LYOPHILIZED, FOR SOLUTION INTRAMUSCULAR; INTRAVENOUS at 17:32

## 2017-01-01 RX ADMIN — HEPARIN SODIUM 5000 UNITS: 5000 INJECTION, SOLUTION INTRAVENOUS; SUBCUTANEOUS at 23:27

## 2017-01-01 RX ADMIN — CEFTRIAXONE 1 G: 1 INJECTION, POWDER, FOR SOLUTION INTRAMUSCULAR; INTRAVENOUS at 17:08

## 2017-01-01 RX ADMIN — HEPARIN SODIUM 5000 UNITS: 5000 INJECTION, SOLUTION INTRAVENOUS; SUBCUTANEOUS at 23:45

## 2017-01-01 RX ADMIN — SIMVASTATIN 20 MG: 20 TABLET, FILM COATED ORAL at 23:45

## 2017-01-01 RX ADMIN — POTASSIUM CHLORIDE 20 MEQ: 1.5 POWDER, FOR SOLUTION ORAL at 13:10

## 2017-01-01 RX ADMIN — HYDROMORPHONE HYDROCHLORIDE 0.5 MG: 1 INJECTION, SOLUTION INTRAMUSCULAR; INTRAVENOUS; SUBCUTANEOUS at 04:26

## 2017-01-01 RX ADMIN — NYSTATIN: 100000 OINTMENT TOPICAL at 08:16

## 2017-01-01 RX ADMIN — LORAZEPAM 0.5 MG: 2 SOLUTION, CONCENTRATE ORAL at 06:03

## 2017-01-01 RX ADMIN — THERA TABS 1 TABLET: TAB at 08:25

## 2017-01-01 RX ADMIN — DEXTROSE MONOHYDRATE 25 G: 25 INJECTION, SOLUTION INTRAVENOUS at 15:57

## 2017-01-01 RX ADMIN — LORAZEPAM 0.5 MG: 2 SOLUTION, CONCENTRATE ORAL at 02:00

## 2017-01-01 RX ADMIN — INSULIN LISPRO 3 UNITS: 100 INJECTION, SOLUTION INTRAVENOUS; SUBCUTANEOUS at 08:55

## 2017-01-01 RX ADMIN — NYSTATIN: 100000 OINTMENT TOPICAL at 15:34

## 2017-01-01 RX ADMIN — HEPARIN SODIUM 5000 UNITS: 5000 INJECTION, SOLUTION INTRAVENOUS; SUBCUTANEOUS at 08:36

## 2017-01-01 RX ADMIN — LINEZOLID 600 MG: 600 INJECTION, SOLUTION INTRAVENOUS at 23:02

## 2017-01-01 RX ADMIN — LEVOTHYROXINE SODIUM 50 MCG: 50 TABLET ORAL at 08:16

## 2017-01-01 RX ADMIN — MORPHINE SULFATE 5 MG: 20 SOLUTION ORAL at 18:45

## 2017-01-01 RX ADMIN — LEVOTHYROXINE SODIUM 50 MCG: 50 TABLET ORAL at 08:37

## 2017-01-01 RX ADMIN — HEPARIN SODIUM 5000 UNITS: 5000 INJECTION, SOLUTION INTRAVENOUS; SUBCUTANEOUS at 08:16

## 2017-01-01 RX ADMIN — ESCITALOPRAM OXALATE 20 MG: 10 TABLET ORAL at 08:16

## 2017-01-01 RX ADMIN — SIMVASTATIN 20 MG: 20 TABLET, FILM COATED ORAL at 22:22

## 2017-01-01 RX ADMIN — Medication 10 ML: at 22:57

## 2017-01-01 RX ADMIN — MORPHINE SULFATE 5 MG: 20 SOLUTION ORAL at 14:09

## 2017-01-01 RX ADMIN — SODIUM POLYSTYRENE SULFONATE 30 G: 15 SUSPENSION ORAL; RECTAL at 16:01

## 2017-01-01 RX ADMIN — MORPHINE SULFATE 5 MG: 20 SOLUTION ORAL at 06:03

## 2017-01-01 RX ADMIN — DEXTROSE MONOHYDRATE AND SODIUM CHLORIDE 75 ML/HR: 5; .9 INJECTION, SOLUTION INTRAVENOUS at 09:01

## 2017-01-01 RX ADMIN — ASPIRIN 81 MG: 81 TABLET, COATED ORAL at 08:36

## 2017-01-01 RX ADMIN — HEPARIN SODIUM 5000 UNITS: 5000 INJECTION, SOLUTION INTRAVENOUS; SUBCUTANEOUS at 08:22

## 2017-01-01 RX ADMIN — LORAZEPAM 0.5 MG: 2 INJECTION INTRAMUSCULAR; INTRAVENOUS at 02:33

## 2017-01-01 RX ADMIN — THERA TABS 1 TABLET: TAB at 08:15

## 2017-01-01 RX ADMIN — LORAZEPAM 0.5 MG: 2 SOLUTION, CONCENTRATE ORAL at 01:55

## 2017-01-01 RX ADMIN — POTASSIUM CHLORIDE AND DEXTROSE MONOHYDRATE: 150; 5 INJECTION, SOLUTION INTRAVENOUS at 08:22

## 2017-01-01 RX ADMIN — MORPHINE SULFATE 5 MG: 20 SOLUTION ORAL at 21:51

## 2017-01-01 RX ADMIN — MORPHINE SULFATE 5 MG: 20 SOLUTION ORAL at 10:45

## 2017-01-01 RX ADMIN — SIMVASTATIN 20 MG: 20 TABLET, FILM COATED ORAL at 21:51

## 2017-01-01 RX ADMIN — NYSTATIN: 100000 OINTMENT TOPICAL at 08:25

## 2017-01-01 RX ADMIN — LINEZOLID 600 MG: 600 INJECTION, SOLUTION INTRAVENOUS at 10:10

## 2017-01-01 RX ADMIN — MORPHINE SULFATE 5 MG: 20 SOLUTION ORAL at 22:07

## 2017-01-01 RX ADMIN — CEFTRIAXONE 1 G: 1 INJECTION, POWDER, FOR SOLUTION INTRAMUSCULAR; INTRAVENOUS at 21:51

## 2017-01-13 PROBLEM — I10 HTN (HYPERTENSION): Status: ACTIVE | Noted: 2017-01-01

## 2017-01-13 PROBLEM — N17.9 AKI (ACUTE KIDNEY INJURY) (HCC): Status: ACTIVE | Noted: 2017-01-01

## 2017-01-13 PROBLEM — A41.9 SEPSIS (HCC): Status: ACTIVE | Noted: 2017-01-01

## 2017-01-13 PROBLEM — E11.9 DIABETES MELLITUS TYPE 2, CONTROLLED (HCC): Status: ACTIVE | Noted: 2017-01-01

## 2017-01-13 PROBLEM — E87.20 LACTIC ACID ACIDOSIS: Status: ACTIVE | Noted: 2017-01-01

## 2017-01-13 PROBLEM — E87.5 HYPERKALEMIA: Status: ACTIVE | Noted: 2017-01-01

## 2017-01-13 PROBLEM — N39.0 UTI (URINARY TRACT INFECTION): Status: ACTIVE | Noted: 2017-01-01

## 2017-01-13 NOTE — ED NOTES
This RN took report from [facility], patient was recently transferred from [facility] after CABG and CVA with left sided deficits. She was alert and oriented yesterday, noted to be lethargic with very little verbal responses today, disoriented. [facility] show elevated lactic acid, elevated potassium. Pt with low temperature and tachycardia prior to arrival to ed.

## 2017-01-13 NOTE — ED NOTES
Paired blood cultures in the lab from [facility] at 10am today. Verbal order for harding for strict I&O's from Dr. Patsy Bass. Urine sent off of cath.

## 2017-01-13 NOTE — H&P
[facility]  [address], [facility]  [phone]    Admission History and Physical      NAME:  Master Mariano   :   1940   MRN:  [id number]     PCP:  Franko Parra MD     Date/Time:  2017         Subjective:     CHIEF COMPLAINT: none, pt confused     HISTORY OF PRESENT ILLNESS:     Ms. Rafael Franco is a 67 y.o. with h/o cad, chf, dm, cva who presents with confusion. History is limited as pt is currently minimally verbal, [facility] reports able to converse at baseline. Pt was recently admitted twice in at [facility]. First hospital stay she underwent CABG which was complicated by CVA with residual hemiparesis and cardiogenic shock. She was discharged and was re-admitted for acute heart failure and was diuresed. She was discharged to [facility] yesterday.        Past Medical History   Diagnosis Date    Chronic pain      hips and legs    Coronary artery disease involving native coronary artery of native heart with unstable angina pectoris ([address] 2016    Diabetes ([address]     GERD (gastroesophageal reflux disease)     Hypertension     On intra-aortic balloon pump assist 2016     Placed in cath lab; left leg    Other ill-defined conditions(799.89)      increased cholesterol    Other ill-defined conditions(799.89)      diverticulosis    Psychiatric disorder      anxiety    Stroke ([address]     Thyroid disease     Unspecified adverse effect of anesthesia      woke up early at end of bunionectomy        Past Surgical History   Procedure Laterality Date    Hx lap cholecystectomy      Hx urological       cytocele and rectocele repair    Hx heent       dental surgery - gum graft    Hx other surgical       colonoscopy x 2 - no polyps per pt    Hx orthopaedic       bilateral carpal tunnel    Hx orthopaedic       bilateral bunionectomy    Hx orthopaedic       partial hip replacement       Social History   Substance Use Topics    Smoking status: Never Smoker    Smokeless tobacco: Not on file    Alcohol use No        Family History   Problem Relation Age of Onset    Heart Disease Mother     COPD Mother     Breast Cancer Maternal Aunt         Allergies   Allergen Reactions    Benadryl [Diphenhydramine Hcl] Rash    Statins-Hmg-Coa Reductase Inhibitors Other (comments)     Elevated LFTS post CABG while taking med.  Sulfa (Sulfonamide Antibiotics) Rash        Prior to Admission medications    Medication Sig Start Date End Date Taking? Authorizing Provider   insulin lispro (HUMALOG) 100 unit/mL injection by SubCUTAneous route four (4) times daily. Indications: Given as sliding scale. Yes Historical Provider   omeprazole (PRILOSEC) 20 mg capsule Take 20 mg by mouth Daily (before breakfast). Yes Historical Provider   acetaminophen (TYLENOL) 325 mg tablet Take 650 mg by mouth every six (6) hours as needed for Pain. Yes Historical Provider   spironolactone (ALDACTONE) 50 mg tablet Take 1 Tab by mouth daily. Indications: Chronic Heart Failure 1/12/17  Yes Gladies Amas, NP   potassium chloride SR (K-TAB) 20 mEq tablet Take 2 Tabs by mouth three (3) times daily. Indications: HYPOKALEMIA PREVENTION 1/12/17  Yes Gladies Amas, NP   nystatin (MYCOSTATIN) 100,000 unit/gram ointment Apply to rash TID. 1/12/17  Yes Gladies Amas, NP   midodrine (PROAMITINE) 10 mg tablet Take 1 Tab by mouth three (3) times daily for 30 days. Indications: Symptomatic Orthostatic Hypotension 1/12/17 2/11/17 Yes Gladies Amas, NP   metOLazone (ZAROXOLYN) 5 mg tablet Take 1 Tab by mouth two (2) times a week. Indications: PULMONARY EDEMA DUE TO CHRONIC HEART FAILURE 1/13/17  Yes Gladies Amas, NP   methylphenidate (RITALIN) 10 mg tablet Take 1 Tab (10 mg total) by mouth dailyIndications: Depression.   Max Daily Amount: 10 mg 1/12/17  Yes Gladies Amas, NP   insulin NPH (NOVOLIN N, HUMULIN N) 100 unit/mL injection 22 Units by SubCUTAneous route Before breakfast and dinner. Indications: type 2 diabetes mellitus 1/12/17  Yes Cheng Young NP   bumetanide (BUMEX) 2 mg tablet Take 1 Tab by mouth two (2) times a day. Indications: PULMONARY EDEMA DUE TO CHRONIC HEART FAILURE 1/12/17  Yes Cheng Young NP   senna-docusate (PERICOLACE) 8.6-50 mg per tablet Take 1 Tab by mouth two (2) times a day. 12/5/16  Yes Cheng Young NP   enoxaparin (LOVENOX) 40 mg/0.4 mL 0.4 mL by SubCUTAneous route every twenty-four (24) hours. For DVT prophylaxis 12/5/16  Yes Cheng Young NP   oxyCODONE-acetaminophen (PERCOCET) 5-325 mg per tablet Take 1-2 Tabs by mouth every four (4) hours as needed. Max Daily Amount: 12 Tabs. 12/5/16  Yes Cheng Young NP   multivitamin (ONE A DAY) tablet Take 1 Tab by mouth daily. Yes Historical Provider   fluticasone (FLONASE) 50 mcg/actuation nasal spray 2 Sprays by Both Nostrils route daily. Yes Historical Provider   escitalopram (LEXAPRO) 20 mg tablet Take 20 mg by mouth daily. Yes Historical Provider   simvastatin (ZOCOR) 20 mg tablet Take 20 mg by mouth nightly. Yes Historical Provider   levothyroxine (SYNTHROID) 50 mcg tablet Take 50 mcg by mouth Daily (before breakfast). Yes Historical Provider   aspirin 81 mg tablet Take 81 mg by mouth daily.    Yes Historical Provider         Review of Systems:  Unable to obtain, pt confused         Objective:      VITALS:    Vital signs reviewed; most recent are:    Visit Vitals    /62    Pulse 97    Temp 98.1 °F (36.7 °C)    Resp 21    Ht 5' (1.524 m)    Wt 48.5 kg (106 lb 14.8 oz)    SpO2 97%    BMI 20.88 kg/m2     SpO2 Readings from Last 6 Encounters:   01/13/17 97%   01/12/17 100%   12/05/16 98%   03/06/14 99%   12/12/11 98%        No intake or output data in the 24 hours ending 01/13/17 3467         Exam:     Physical Exam:    Gen:  Elderly, chronically ill appeairng, in no acute distress  HEENT:  Pink conjunctivae, PERRL, hearing intact to voice, moist mucous membranes  Neck:  Supple, without masses, thyroid non-tender  Resp:  No accessory muscle use, clear breath sounds without wheezes rales or rhonchi  Card:  No murmurs, normal S1, S2 without thrills, bruits or peripheral edema  Abd:  Soft, non-tender, non-distended, normoactive bowel sounds are present, no palpable organomegaly  Lymph:  No cervical adenopathy  Musc:  No cyanosis or clubbing  Skin:  Rash noted on chest, abdomen and legs  Neuro:  Unable to follow commands  Psych:  Alert with poor insight. Awakens to voice       Labs:    Recent Labs      01/13/17   1214   WBC  17.0*   HGB  12.3   HCT  40.8   PLT  805*     Recent Labs      01/13/17   1214  01/13/17   0445   01/11/17   0117   NA  137  140   --   130*   K  6.0*  6.7*   --   3.0*   CL  100  103   --   88*   CO2  26  27   --   32   GLU  87  252*   --   145*   BUN  93*  87*   --   80*   CREA  1.35*  1.33*   --   0.98   CA  9.6  9.9   --   9.3   MG   --   2.8*   --   2.9*   PHOS   --    --    --   2.3*   ALB  3.4*  3.5   < >   --    SGOT  42*  33   < >   --    ALT  54  58   < >   --     < > = values in this interval not displayed. No components found for: GLPOC  No results for input(s): PH, PCO2, PO2, HCO3, FIO2 in the last 72 hours. No results for input(s): INR in the last 72 hours. No lab exists for component: INREXT    Chest Xray:  No acute disease  EKG reviewed:   NSR       Assessment/Plan:       Metabolic encephalopathy: worsened mental status noted at [facility], suspect 2/2 possible UTI vs metabolic derangements on BMP. CT head notes atrophy and chronic small vessel disease, no new findings    Hyperkalemia: likely 2/2 aldactone, potassium supplements and over diuresis. EKG intervals normal. Improved from [facility]. Start medical tx with insulin, glucose, kayexalate, calcium. Recheck K this evening. Hold aldactone and potassium supplements. PADMINI / Lactic acidosis: suspect overdiuresis. Given 1.5L in ER.  Will start low IVF rate and monitor volume status closely given recent admit for chf. Sepsis / ?UTI: POA. CXR notes no pneumonia. Pt had +blood cultures at [facility], will repeat these. UA notes ? UTI will start empiric ctx. CAD / S/P CABG x 3: recent surgery at [address] [place] [address] complicated by cardiogenic shock. Resume asa and statin. No BB 2/2 hypoTN noted during last hospital stay      Chronic Systolic heart failure: volume status stable. Echo 12/2016 notes EF 15%. Monitor daily wt, I/O. Hold aldactone, bumex, metolazone, bumex. Consult cardiology    h/o  CVA (cerebral vascular accident): occurred during CABG. Cont asa, statin. Consult PT/OT/speech and nutrition for tube feed management      HTN (hypertension): low BP at [facility] noted. Cont midodrine      Diabetes mellitus type 2, controlled: resume NPH with SSI    Rash: noted at [facility] felt to be due to ?yeast. Will continue nystatin.  Holding diuretics,     DNR/DNI -- durable filled out last hospital stay with palliative    Surrogate decision maker: son    Total time spent with patient: [phone] [facility] discussed with: Patient and Consultant/Specialist    Discussed:  Care Plan    Prophylaxis:  Hep SQ    Probable Disposition:  SNF/LTC           ___________________________________________________    Attending Physician: Linette Heller MD

## 2017-01-13 NOTE — PROGRESS NOTES
[facility]: MED RECONCILIATION    Comments/Recommendations:    Patient is non-verbal    Medication history was done with transferred paperwork from [facility]   Medications added:     · Insulin Lispro sliding scale  · Tylenol 325 mg prn pain    Medications removed:    · None    Medications adjusted:    · None     Information obtained from: Transferred paper from [facility]    Significant PMH/Disease States:   Past Medical History   Diagnosis Date    Chronic pain      hips and legs    Coronary artery disease involving native coronary artery of native heart with unstable angina pectoris ([facility] 11/14/2016    Diabetes ([facility]     GERD (gastroesophageal reflux disease)     Hypertension     On intra-aortic balloon pump assist 11/14/2016     Placed in cath lab; left leg    Other ill-defined conditions(799.89)      increased cholesterol    Other ill-defined conditions(799.89)      diverticulosis    Psychiatric disorder      anxiety    Stroke ([facility]     Thyroid disease     Unspecified adverse effect of anesthesia      woke up early at end of bunionectomy        Chief Complaint for this Admission:   Chief Complaint   Patient presents with    Lethargy    Fever        Allergies: Benadryl [diphenhydramine hcl]; Statins-hmg-coa reductase inhibitors; and Sulfa (sulfonamide antibiotics)    Prior to Admission Medications:     Medication Documentation Review Audit       Reviewed by Nicolasa Sarmiento (Pharmacy Student) on 01/13/17 at 1424         Medication Sig Documenting Provider Last Dose Status Taking?      acetaminophen (TYLENOL) 325 mg tablet Take 650 mg by mouth every six (6) hours as needed for Pain. Historical Provider 1/5/2017 pm Active Yes    aspirin 81 mg tablet Take 81 mg by mouth daily. Historical Provider 1/12/2017 am Active Yes    bumetanide (BUMEX) 2 mg tablet Take 1 Tab by mouth two (2) times a day.  Indications: PULMONARY EDEMA DUE TO CHRONIC HEART FAILURE Lawrence Moore NP 1/12/2017 pm Active Yes enoxaparin (LOVENOX) 40 mg/0.4 mL 0.4 mL by SubCUTAneous route every twenty-four (24) hours. For DVT prophylaxis Kelsey West NP 1/12/2017 am Active Yes    escitalopram (LEXAPRO) 20 mg tablet Take 20 mg by mouth daily. Historical Provider  Active Yes             Med Note Alethea Costello   Fri Jan 13, 2017  1:54 PM): Yesterday (1/12) patient received 10 mg dose, but have received an order to start 20 mg dose on 1/13/2016      fluticasone (FLONASE) 50 mcg/actuation nasal spray 2 Sprays by Both Nostrils route daily. Historical Provider 1/12/2017 am Active Yes    insulin lispro (HUMALOG) 100 unit/mL injection by SubCUTAneous route four (4) times daily. Indications: Given as sliding scale. Historical Provider 1/12/2017 am Active Yes             Med Note Kristin Laureano, [facility]   Fri Jan 13, 2017  2:22 PM): Last dose was 2 units. insulin NPH (NOVOLIN N, HUMULIN N) 100 unit/mL injection 22 Units by SubCUTAneous route Before breakfast and dinner. Indications: type 2 diabetes mellitus Kelsey West NP 1/13/2017 am Active Yes    levothyroxine (SYNTHROID) 50 mcg tablet Take 50 mcg by mouth Daily (before breakfast). Historical Provider 1/13/2017 am Active Yes    methylphenidate (RITALIN) 10 mg tablet Take 1 Tab (10 mg total) by mouth dailyIndications: Depression. Max Daily Amount: 10 mg Kelsey West NP  Active Yes    metOLazone (ZAROXOLYN) 5 mg tablet Take 1 Tab by mouth two (2) times a week. Indications: PULMONARY EDEMA DUE TO CHRONIC HEART FAILURE Kelsey West NP  Active Yes    midodrine (PROAMITINE) 10 mg tablet Take 1 Tab by mouth three (3) times daily for 30 days. Indications: Symptomatic Orthostatic Hypotension Kelsey West NP  Active Yes    multivitamin (ONE A DAY) tablet Take 1 Tab by mouth daily. Historical Provider  Active Yes    nystatin (MYCOSTATIN) 100,000 unit/gram ointment Apply to rash TID.  Kelsey West NP 1/13/2017 am Active Yes    omeprazole (PRILOSEC) 20 mg capsule Take 20 mg by mouth Daily (before breakfast). Historical Provider  Active Yes             Med Note (Brandi Brand   Fri Jan 13, 2017  2:21 PM): Pantoprazole 40 mg was given on 1/13/2017 am       oxyCODONE-acetaminophen (PERCOCET) 5-325 mg per tablet Take 1-2 Tabs by mouth every four (4) hours as needed. Max Daily Amount: 12 Tabs. Hailee Phoenix NP 1/3/2017 pm Active Yes    potassium chloride SR (K-TAB) 20 mEq tablet Take 2 Tabs by mouth three (3) times daily. Indications: HYPOKALEMIA PREVENTION Hailee Phoenix NP 1/12/2017 pm Active Yes    senna-docusate (PERICOLACE) 8.6-50 mg per tablet Take 1 Tab by mouth two (2) times a day. Hailee Phoenix NP 1/12/2017 hs Active Yes    simvastatin (ZOCOR) 20 mg tablet Take 20 mg by mouth nightly. Historical Provider 1/12/2017 hs Active Yes    spironolactone (ALDACTONE) 50 mg tablet Take 1 Tab by mouth daily. Indications: Chronic Heart Failure Hailee Phoenix NP 1/12/2017 am Active Yes                        Thank you  Lisseth Nina  Pharm. D.  Candidate 2017

## 2017-01-13 NOTE — ED PROVIDER NOTES
HPI Comments: 67 y.o. female with past medical history significant for thyroid disease, diabetes, anxiety, HTN, GERD, chronic pain, diverticulosis, CAD, intra-aortic balloon pump assist, stroke who presents with chief complaint of lethargy. Per nursing home, pt was recently transferred from [facility] to [address] s/p CABG with intraoperative CVA (L-sided deficits noted). Today, pt was noted to have decreased mental status and concern for sepsis, so pt was sent to ED. Nursing home reports potassium of 6.7 and lactic acid of 3.3. Yesterday, pt was alert and oriented. At baseline, pt keeps eyes closed but is able to answer questions. There are no other acute medical concerns at this time. PCP: Elif Murrieta MD    Note written by Jeffry Light, as dictated by Andrei Yuen MD 12:38 PM      The history is provided by the long-term. The history is limited by the condition of the patient.         Past Medical History:   Diagnosis Date    Chronic pain      hips and legs    Coronary artery disease involving native coronary artery of native heart with unstable angina pectoris ([address] 11/14/2016    Diabetes ([address]     GERD (gastroesophageal reflux disease)     Hypertension     On intra-aortic balloon pump assist 11/14/2016     Placed in cath lab; left leg    Other ill-defined conditions(799.89)      increased cholesterol    Other ill-defined conditions(799.89)      diverticulosis    Psychiatric disorder      anxiety    Stroke ([address]     Thyroid disease     Unspecified adverse effect of anesthesia      woke up early at end of bunionectomy       Past Surgical History:   Procedure Laterality Date    Hx lap cholecystectomy      Hx urological       cytocele and rectocele repair    Hx heent       dental surgery - gum graft    Hx other surgical       colonoscopy x 2 - no polyps per pt    Hx orthopaedic       bilateral carpal tunnel    Hx orthopaedic       bilateral bunionectomy    Hx orthopaedic       partial hip replacement         Family History:   Problem Relation Age of Onset    Heart Disease Mother     COPD Mother     Breast Cancer Maternal Aunt        Social History     Social History    Marital status:      Spouse name: N/A    Number of children: N/A    Years of education: N/A     Occupational History    Not on file. Social History Main Topics    Smoking status: Never Smoker    Smokeless tobacco: Not on file    Alcohol use No    Drug use: No    Sexual activity: Not on file     Other Topics Concern    Not on file     Social History Narrative         ALLERGIES: Benadryl [diphenhydramine hcl]; Statins-hmg-coa reductase inhibitors; and Sulfa (sulfonamide antibiotics)    Review of Systems   Unable to perform ROS: Acuity of condition   patient with depressed level of consciousness    Vitals:    01/13/17 1202   BP: 99/63   Pulse: 99   Resp: 23   SpO2: 99%   Weight: 48.5 kg (106 lb 14.8 oz)   Height: 5' (1.524 m)            Physical Exam   Constitutional: She appears well-developed and well-nourished. She appears lethargic. HENT:   Head: Normocephalic and atraumatic. Neck: No tracheal deviation present. Cardiovascular: Normal rate and intact distal pulses. Pulmonary/Chest: Effort normal. No respiratory distress. Abdominal: Soft. There is no tenderness. Musculoskeletal: She exhibits no edema or deformity. Neurological: She appears lethargic. Not following commands at this time   Nursing note and vitals reviewed.        MDM  Number of Diagnoses or Management Options  Altered mental status, unspecified:   Sepsis, due to unspecified organism [facility]):   Diagnosis management comments: Patient with sepsis criteria - no source - start IVF, labs will initiate ABX coverage   Hospitalist to admit       Amount and/or Complexity of Data Reviewed  Clinical lab tests: ordered and reviewed  Tests in the radiology section of CPT®: ordered and reviewed  Discuss the patient with other providers: yes (Dr. Gamino - sent patient to me from [address] [place] [address]  Hospitalist to admit)  Independent visualization of images, tracings, or specimens: yes    Risk of Complications, Morbidity, and/or Mortality  Presenting problems: high  Diagnostic procedures: high  Management options: high    Critical Care  Total time providing critical care: (Total critical care time spend exclusive of procedures: 30 minutes)    ED Course       Procedures    ED EKG interpretation:  Rhythm: normal sinus rhythm; and regular . Rate (approx.): 99; Axis: normal; ST/T wave abnormality, consider lateral ischemia  Note written by Versie Gums A. Evern Schlatter, as dictated by Jasper Helm MD 12:31 PM      CT Report:  EXAM: CT HEAD WO CONT     INDICATION: Confusion- past CVA     COMPARISON: None.     TECHNIQUE: Unenhanced CT of the head was performed using 5 mm images. Brain and  bone windows were generated. CT dose reduction was achieved through use of a  standardized protocol tailored for this examination and automatic exposure  control for dose modulation.      FINDINGS:  Mild diffuse ventricular prominence is again shown as is minimal cortical sulcal  prominence of the cerebrum consistent with atrophy. Moderate periventricular  diminished attenuation is again demonstrated, again shown to be a asymmetrically  greater in the frontal regions bilaterally, consistent with chronic small vessel  ischemic disease the white matter. There is no intracranial hemorrhage,  extra-axial collection, mass, mass effect or midline shift. The basilar  cisterns are open. No acute infarct is identified. The bone windows demonstrate  no abnormalities. The visualized portions of the paranasal sinuses and mastoid  air cells are clear.     IMPRESSION  IMPRESSION: Atrophy and chronic small vessel ischemic disease the white matter  again shown. No acute findings or interval change demonstrated.      X-ray Report:  INDICATION: Follow-up CHF, rule out pneumonia.     Portable AP semiupright view of the chest.     Direct comparison made to prior chest x-ray dated December 24, 2016.     Cardiomediastinal silhouette is stable. Median sternotomy wires are noted. Lungs  are clear bilaterally. Pleural spaces are normal. Osseous structures are intact.     IMPRESSION  IMPRESSION: No acute cardiopulmonary disease.

## 2017-01-13 NOTE — ED TRIAGE NOTES
[facility] for decrease in mental status, fever and general weakness per [facility] pt had an elevated lactic acid, pt is non verbal at this time HR 95 BP 99/63 meets sepsis criteria

## 2017-01-13 NOTE — ED NOTES
SFM Code Purple SBAR    SIRS Criteria WBC >12 or <4, HR >90 bpm, RR >20  Mental Status Level of Consciousness: (!) Confused or Agitated    Labs/Lactic Acid Drawn at:   1214 , repeat 1610    Fluid resuscitation total 1455 mL Completed    Antibiotics Hanging?  YES Zosyn- 1526; Rocephin 1708    VasopressorHanging  No     Visit Vitals    /59    Pulse 97    Temp 98.1 °F (36.7 °C)    Resp 17    Ht 5' (1.524 m)    Wt 48.5 kg (106 lb 14.8 oz)    SpO2 99%    BMI 20.88 kg/m2

## 2017-01-13 NOTE — PROGRESS NOTES
1/13/2017   CARE MANAGEMENT NOTE:  CM is following pt in the ER for READMISSION. EMR reviewed. Per chart hx pt was hospitalized at [facility] from 11/10/16 - 12/5/16 with dx of acute CHF. Pt discharged to [facility] but returned later on 12/5 and remained at [facility] until 1/12/17 with dx of HF. Upon discharge pt went to [facility] location. Initial Assessment:  Reportedly, pt's son resides with her in a [place] home. Pt has been ambulatory with a cane and has 24/7 care. She uses mail order or [facility] on [facility].  PCP is Dr. Jacqueline Craig. Readmission Assessment:  Pt presents to [facility] ER today from [address] [place] [address]. She will be readmitted for further medical management and dx of sepsis. Action Plan:  CM will monitor pt's current hospital course and will assist with post discharge needs as appropriate.   Dirk

## 2017-01-14 NOTE — H&P
[address]  Post Admission History and Physical Examination  And Individualized Initial Plan of Care  Patient: Wilfrido Gaspar  UST:[id number]  Admit Date: 1/12/2017  Admitting/Attending Physician: Marilou Kumar MD  Referring Physician: [facility]  Primary Care Physician: Jigna Osuna MD  ID: Ranjan Lewis MD  Renal: Maida Lopez MD  Cardiologist: Dr. Chase Lui    Date of Service:  January 12, 2017    Admit Diagnosis: Recent R) frontal subacute CVA with L) HP     CHIEF COMPLAINT: L) weakness    HISTORY OF PRESENT ILLNESS: Records reviewed. Patient is a 67 y.o., Righthanded female, with newly diagnosed heart failure with significant CAD requiring CABG x 3 on 11/14/16 with subsequent decompensation while in the catheterization lab, becoming hypotensive and bradycardic, noted to have left-sided paralysis while in the cath lab prior to 3-vessel coronary artery bypass graft and diagnosed with a R frontal subacute CVA. Patient was discharged on 12/5 from the cardiothoracic surgery service post an extensive hospitalization to [facility] for rehab. The following day on 12/6 was re-admitted to [facility] for dyspnea, BNP was markedly elevated. CXR done and positive for pulmonary edema and was treated for acute on chronic systolic HF. She had hypotension, shock liver, hypokalemia, hyponatremia, leukocytosis, generalized rash thought to be fungal and started on nystatin ointment. She was also followed by renal and ID for these issues, she had + blood culture of uncertain significance, was on vancomycin for some time. She was seen by palliative med and was placed on DNR/DNI. She was cleared to resume IPR on 1/12/17. On exam, she has persistent L) sided weakness, was awake, alert and oriented to self, situation \"stroke and heart attack\", \"here for therapy\" with cuing what she is here for at [facility].          Past Medical History   Diagnosis Date    Chronic pain      hips and legs    Coronary artery disease involving native coronary artery of native heart with unstable angina pectoris ([facility] [address] 11/14/2016    Diabetes ([facility] [address]     GERD (gastroesophageal reflux disease)     Hypertension     On intra-aortic balloon pump assist 11/14/2016     Placed in cath lab; left leg    Other ill-defined conditions(799.89)      increased cholesterol    Other ill-defined conditions(799.89)      diverticulosis    Psychiatric disorder      anxiety    Stroke [facility])     Thyroid disease     Unspecified adverse effect of anesthesia      woke up early at end of bunionectomy       Past Surgical History   Procedure Laterality Date    Hx lap cholecystectomy      Hx urological       cytocele and rectocele repair    Hx heent       dental surgery - gum graft    Hx other surgical       colonoscopy x 2 - no polyps per pt    Hx orthopaedic       bilateral carpal tunnel    Hx orthopaedic       bilateral bunionectomy    Hx orthopaedic       partial hip replacement      Family History   Problem Relation Age of Onset    Heart Disease Mother     COPD Mother     Breast Cancer Maternal Aunt       Social History   Substance Use Topics    Smoking status: Never Smoker    Smokeless tobacco: Not on file    Alcohol use No       Functional History:   Premorbid - independent   Precautions:cardiac, aspirations  Current level of function -    Mod assist x 2 for bed mobility, max assist x 2 for transfers                Allergies   Allergen Reactions    Benadryl [Diphenhydramine Hcl] Rash    Statins-Hmg-Coa Reductase Inhibitors Other (comments)     Elevated LFTS post CABG while taking med.  Sulfa (Sulfonamide Antibiotics) Rash      Current Discharge Medication List           START taking these medications     Details   spironolactone (ALDACTONE) 50 mg tablet Take 1 Tab by mouth daily. Indications: Chronic Heart Failure  Qty: 30 Tab, Refills: 1       potassium chloride SR (K-TAB) 20 mEq tablet Take 2 Tabs by mouth three (3) times daily.  Indications: HYPOKALEMIA PREVENTION  Qty: 180 Tab, Refills: 1       nystatin (MYCOSTATIN) 100,000 unit/gram ointment Apply to rash TID. Qty: 15 g, Refills: 0       midodrine (PROAMITINE) 10 mg tablet Take 1 Tab by mouth three (3) times daily for 30 days. Indications: Symptomatic Orthostatic Hypotension  Qty: 90 Tab, Refills: 1       metOLazone (ZAROXOLYN) 5 mg tablet Take 1 Tab by mouth two (2) times a week. Indications: PULMONARY EDEMA DUE TO CHRONIC HEART FAILURE  Qty: 10 Tab, Refills: 1       methylphenidate (RITALIN) 10 mg tablet Take 1 Tab (10 mg total) by mouth dailyIndications: Depression. Max Daily Amount: 10 mg  Qty: 30 Tab, Refills: 1       insulin NPH (NOVOLIN N, HUMULIN N) 100 unit/mL injection 22 Units by SubCUTAneous route Before breakfast and dinner. Indications: type 2 diabetes mellitus  Qty: 1 Vial, Refills: 1       bumetanide (BUMEX) 2 mg tablet Take 1 Tab by mouth two (2) times a day. Indications: PULMONARY EDEMA DUE TO CHRONIC HEART FAILURE  Qty: 60 Tab, Refills: 1               CONTINUE these medications which have NOT CHANGED     Details   senna-docusate (PERICOLACE) 8.6-50 mg per tablet Take 1 Tab by mouth two (2) times a day. Qty: 60 Tab, Refills: 0       enoxaparin (LOVENOX) 40 mg/0.4 mL 0.4 mL by SubCUTAneous route every twenty-four (24) hours. For DVT prophylaxis  Qty: 30 Syringe, Refills: 0       multivitamin (ONE A DAY) tablet Take 1 Tab by mouth daily.       fluticasone (FLONASE) 50 mcg/actuation nasal spray 2 Sprays by Both Nostrils route daily.       escitalopram (LEXAPRO) 20 mg tablet Take 20 mg by mouth daily.       simvastatin (ZOCOR) 20 mg tablet Take 20 mg by mouth nightly.       levothyroxine (SYNTHROID) 50 mcg tablet Take 50 mcg by mouth daily.       aspirin 81 mg tablet Take 81 mg by mouth daily.       oxyCODONE-acetaminophen (PERCOCET) 5-325 mg per tablet Take 1-2 Tabs by mouth every four (4) hours as needed. Max Daily Amount: 12 Tabs.   Qty: 40 Tab, Refills: 0       omeprazole (PRILOSEC) 20 mg capsule Take 20 mg by mouth daily.             Review of Systems:    Constitutional: negative fever, negative chills, poor appetite on TF at night  Eyes:   negative visual changes  ENT:   negative difficulty swallowing, sore throat, tongue or lip swelling  Respiratory:  resolved dyspnea  Cards:  negative for chest pain, palpitations, lower extremity edema  GI:   negative for nausea, vomiting, diarrhea, and abdominal pain  :  negative for frequency, dysuria or hematuria  Integument:  negative for rash and pruritus  Heme:  negative for easy bruising and bleeding  Musculoskel: negative for myalgias, neck pain,  back pain   Neuro:  negative for headaches, dizziness, numbness, persistent L) weakness  Psych:  negative for feelings of anxiety, depression     Physical Exam:  General:  Skin:     HEENT:  Neck: Alert, not in distress. Generalized maculopapular red rash, sternal incision open to air healed  PERRL, anicteric sclerae, oropharynx clear  Supple, thyroid not palpable   Lungs:   Clear to auscultation bilaterally. Heart:  Regular rate and rhythm. Abdomen:   Soft, non-tender. Bowel sounds present. PEG intact   Genitourinary:  Musculoskeletal: No harding. Calves soft, nontender. No lower extremity edema. Mildly increased tone L) side LE>UE. Neuro:          Psych:     Speech and language clear and fluent, Oriented to self, sitaution  follows all 1-step verbal directives. Sensory: intact to light touch in all limbs Motor: L) HP: 3/5 in UE, 1-2/5 in LE, +  Footdrop and forefoot adduction, she tolerated putting he foot and ankle to neutral with her PRAFO  Pleasant and cooperative, no anxiety or agitation       Labs/Studies:      Lab Results   Component Value Date/Time    Specimen Description: URINE 09/04/2009 11:13 AM    Culture result:  12/16/2016 02:27 PM     STAPHYLOCOCCUS EPIDERMIDIS  (OXACILLIN RESISTANT)  , ISOLATED FROM BOTH BOTTLES DRAWN. ..L.QUAD LUMEN SITE      Culture result: 2016 02:27 PM     PRELIMINARY REPORT OF  GRAM POSITIVE COCCI  IN CLUSTERS  GROWING IN 1 OF 2 BOTTLES DRAWN  , LEFT QUAD LUMEN  CALLED TO AND READ BACK BY  VINICIO WARREN 16 1502. SCP. Culture result: NO GROWTH 5 DAYS 2016 11:20 PM     Lab Results   Component Value Date/Time    Hemoglobin A1c 6.1 2016 03:10 AM       IMAGIN16 echo  Left ventricle: The ventricle was mildly dilated. Systolic function was  severely reduced. Ejection fraction was estimated to be 15 %. There was  severe diffuse hypokinesis  16 CTA  1. Evidence of right frontal lobe subacute infarction in a border zone  distribution. Consider right carotid Doppler ultrasound to evaluate for proximal  stenosis. 2. Mild/moderate right internal carotid artery proximal siphon stenosis. 3. No other evidence of significant skull base or intracranial arterial  stenosis. ASSESSMENT AND PLAN: Recent R) frontal subacute CVA with L) HP with functional decline and associated medical co morbidities    CO-MORBID CONDITIONS PRESENT ON ADMISSION THAT MAY WORSEN DUE TO THE RIGORS OF IPR PROGRAM THAT MAY AFFECT PATIENT PARTICIPATION AND FUNCTIONAL GAINS THERFORE REQUIRES 24-hour rehab nursing and ongoing close physician oversight:  SECONDARY DIAGNOSES:  1. S/p CABG x 3 on 16: On asa, statin. No BB for now d/t hypotension. 2. Acute on chronic systolic HF: (NYHA III admission, NYHA III on d/c). Bumex 2mg PO --reduce BID. Continue metolazone(reduce to 2x/week), aldactone. No BB/ACE until appropriate. 3. Hypotension: Improved. Monitor. Continue midodrine 10 mg TID. 4. Acute respiratory failure w/ pulmonary edema: On RA. Increase IS and activity as tolerated. Cont duo-nebs and diuresis. 5.Shock liver: LFTS improving. Pt has no gallbladder. Cont statin- elevated liver enzymes related to cardiogenic shock, but will cont statin low dose. Cont reduced dose of lexapro at 10mg. 6. Nutrition: Nutrition following.  TF on Osmolite 1.5  80ml/hr at night, will change to Jevity 1.5 for now as no Osmolite here and consult dietician in am to adjust TF. Decrease free water flush 50 ml every 4 hours. Encourage PO intake, document % intake at every meal. Speech eval cleared for mech soft    7. DM type 2-  cont BS ACHS, SSI per orders-- on 22 units NPH BID. 8. DVT/GI prophylaxis: Protonix, lovenox and SCDs bilat.    9. Hypokalemia:  Cont 40 meq --increase to TID. Cont to monitor. On spironolactone. 10. Hyponatremia: s/t diuresis. Reduce free water flushes 50 ml every 4 hours. 11. Failure to thrive: Cont ritalin PO--change to daily in the morning. Monitor. 12. Constipation: + BM 1/9- resume pericolace BID. PRN miralax and bisacodyl. 13. Leukocytosis: Resolving, afebrile. off antibiotics, ID following. 14. Low phosphorus: Improved. Monitor. 15. Generalized rash: Possibly yeast in nature, NO NEW MEDS Added, add nystatin ointment. Given 1 dose of diflucan. 15. DNR/DNI. No IV dobutamine. INITIAL INDIVIDUALIZED REHABILITATION PLAN OF CARE: The following plan of care was developed in consideration of therapy notes and review of the preadmission assessment. SPECIFIC REHABILITATION NEEDS/INDIVIDUALIZED REHABILITATION PLAN:  1. PT 1 to 2 hours a day, 5 to 6 days a week for ambulation, bed mobility, transfers, strengthening, range of motion, balance, and endurance program, family ed, cardiac precautions. 2. Occupational therapy 1 to 2 hours a day, 5 to 6 days a week for transfers, basic ADLs, strengthening, endurance, coordination, and energy conservation techniques, use of AE s needed,family ed, cardiac precautions. 3. Nursing 24-hour care by a specialized nurse trained in rehabilitation for disease process and medication education, maintaining skin integrity, pain control, optimizing nutrition and hydration, DVT and pulmonary embolus monitoring, fall prevention, reinforce mobility and ADL skills. 4.  SLP for neurolingusitic, communication,dysphagia eval and intervention. 5. Medical Psychology for evaluation of mood or adjustment disorder, neurocognitive assessment and for supportive therapy. 6. Case management 1 hour a day, 3 to 5 days a week for discharge planning, community resources, and team coordination for safe discharge. 7. Registered dietitian to optimize nutrition and education. REHABILITATION POTENTIAL & GOALS: Her rehabilitation potential is good to make improvements to overall functional goals of mod I to min assist in ambulation, transfers and self-care. ESTIMATED LENGTH OF STAY:  2-3 weeks    ANTICIPATED DISCHARGE DISPOSITION: Home    POST INPATIENT REHABILITATION PLAN: HH therapies versus outpt program     POST INPATIENT MEDICAL FOLLOWUP: PCP, neurology, cardio, renal,  PMand R    POST-ADMISSION PHYSICIAN EVALUATION: The existing medical and rehabilitation complications and the potential complications due to the rigors of the rehabilitation program are discussed in the above comorbidities and initial individualized rehabilitation plan of care. COMPARISON TO PREADMISSION SCREENING: Compared to the preadmission screening, the patients current function and medical condition remains about the same, theres no relevant significant changes since the pre admission assessment was completed. An inpatient setting is still the most appropriate level of care for this patient based upon the above noted conditions and comorbidities. The patient remains stable to continue intensive IPR program and requires and can benefit from therapy 3 hours a day, 5 days a week minimum to achieve the functional goals and maintain medical stability. Weekly team meetings will be used throughout IPR stay to review progress, identify barriers and determine solutions to these barriers within previously set goals.  Revision to goals and care plan will be discussed whenever necessary with other consulting physicians, nursing staff and therapists and case management.       CC:  Primary Care Physician: James Lucero MD  ID: Gabrielle Barbour MD  Renal: Cristy Eddy MD  Cardiologist: Dr. Lucy Stephens

## 2017-01-14 NOTE — PROGRESS NOTES
Bedside shift change report given to Digna Blanco RN (oncoming nurse) by Preston Lei RN (offgoing nurse). Report included the following information SBAR, Kardex and MAR.

## 2017-01-14 NOTE — CONSULTS
Cardiology Consultation Note                                 Larry Dean MD, [address], [place], [address] [facility]                         Phone [phone]; Fax [phone]            2017 11:57 AM  James Lucero MD  :  1940   MRN:  [id number]     CC: no cardiac issues  Reason for consult: presume recnet CVA and on no ACE-I or beta blocker  Admission Diagnosis: Sepsis ([address]  Requesting MD:     ASSESSMENT/RECOMMENDATIONS:   1)CAD/S/P CABG complicated by CVA and one admission after bypass for CHF   -watch I/O's closely with electrolytes    2) Ischemic CM with EF by ath of 30%  -will need most likely ACE-I, Beta blockade at some point    3) Hyperkalemia on spironocatone and potassium  -resoving      WBC 17.8K, creatinine 1.35 ( up slightly), K 4.8, TSH 0.46, CXR no acute cardiopulmonary disease, ECG normal sinus with LAE and nonspecific ST-T changes  Pro-BNP yesterday     She was on spironolactone, Bumex and zaroxolyn based on PTA med's. Agree with holding diuretics until re equilibrates. May need diuretics again but gently add back. See Fernando is a 67 y.o. female I am seeing for diabetes, hypertension, hyperlipidemia, coronary artery disease and status post CABG. Symptoms include: confusion  Cardiac risk factors: dyslipidemia, diabetes mellitus, sedentary life style, hypertension, post-menopausal. Ms. Goran Traore has had a rough course after her CABG. She had ner CABG in nov. And then had a CVA and was readmitted to [facility]. She was admitted to [facility] and developed confusion and was transferred to [facility]. Her Postassium was elevated. She was discharged a couple of days ago after admission for CHF. Will plan on limited echo tomorrow looking at EF.         Allergies   Allergen Reactions    Benadryl [Diphenhydramine Hcl] Rash    Statins-Hmg-Coa Reductase Inhibitors Other (comments)     Elevated LFTS post CABG while taking med.  Sulfa (Sulfonamide Antibiotics) Rash         Past Medical History   Diagnosis Date    Chronic pain      hips and legs    Coronary artery disease involving native coronary artery of native heart with unstable angina pectoris [facility] 2016    Diabetes ([facility]     GERD (gastroesophageal reflux disease)     Hypertension     On intra-aortic balloon pump assist 2016     Placed in cath lab; left leg    Other ill-defined conditions(799.89)      increased cholesterol    Other ill-defined conditions(799.89)      diverticulosis    Psychiatric disorder      anxiety    Stroke [facility]     Thyroid disease     Unspecified adverse effect of anesthesia      woke up early at end of bunionectomy        Past Surgical History   Procedure Laterality Date    Hx lap cholecystectomy      Hx urological       cytocele and rectocele repair    Hx heent       dental surgery - gum graft    Hx other surgical       colonoscopy x 2 - no polyps per pt    Hx orthopaedic       bilateral carpal tunnel    Hx orthopaedic       bilateral bunionectomy    Hx orthopaedic       partial hip replacement        . Home Medications:  Prior to Admission Medications   Prescriptions Last Dose Informant Patient Reported? Taking?   acetaminophen (TYLENOL) 325 mg tablet 2017 at pm Transfer Papers Yes Yes   Sig: Take 650 mg by mouth every six (6) hours as needed for Pain. aspirin 81 mg tablet 2017 at am Transfer Papers Yes Yes   Sig: Take 81 mg by mouth daily. bumetanide (BUMEX) 2 mg tablet 2017 at pm Transfer Papers No Yes   Sig: Take 1 Tab by mouth two (2) times a day. Indications: PULMONARY EDEMA DUE TO CHRONIC HEART FAILURE   enoxaparin (LOVENOX) 40 mg/0.4 mL 2017 at am Transfer Papers No Yes   Si.4 mL by SubCUTAneous route every twenty-four (24) hours. For DVT prophylaxis   escitalopram (LEXAPRO) 20 mg tablet  Transfer Papers Yes Yes   Sig: Take 20 mg by mouth daily.    fluticasone (FLONASE) 50 mcg/actuation nasal spray 2017 at am Transfer Papers Yes Yes   Si Sprays by Both Nostrils route daily. insulin NPH (NOVOLIN N, HUMULIN N) 100 unit/mL injection 2017 at am Transfer Papers No Yes   Si Units by SubCUTAneous route Before breakfast and dinner. Indications: type 2 diabetes mellitus   insulin lispro (HUMALOG) 100 unit/mL injection 2017 at am Transfer Papers Yes Yes   Sig: by SubCUTAneous route four (4) times daily. Indications: Given as sliding scale. levothyroxine (SYNTHROID) 50 mcg tablet 2017 at am Transfer Papers Yes Yes   Sig: Take 50 mcg by mouth Daily (before breakfast). metOLazone (ZAROXOLYN) 5 mg tablet  Transfer Papers No Yes   Sig: Take 1 Tab by mouth two (2) times a week. Indications: PULMONARY EDEMA DUE TO CHRONIC HEART FAILURE   methylphenidate (RITALIN) 10 mg tablet  Transfer Papers No Yes   Sig: Take 1 Tab (10 mg total) by mouth dailyIndications: Depression. Max Daily Amount: 10 mg   midodrine (PROAMITINE) 10 mg tablet 2017 at Unknown time Transfer Papers No Yes   Sig: Take 1 Tab by mouth three (3) times daily for 30 days. Indications: Symptomatic Orthostatic Hypotension   multivitamin (ONE A DAY) tablet  Transfer Papers Yes Yes   Sig: Take 1 Tab by mouth daily. nystatin (MYCOSTATIN) 100,000 unit/gram ointment 2017 at am Transfer Papers No Yes   Sig: Apply to rash TID. omeprazole (PRILOSEC) 20 mg capsule 2017 at Unknown time Transfer Papers Yes Yes   Sig: Take 20 mg by mouth Daily (before breakfast). oxyCODONE-acetaminophen (PERCOCET) 5-325 mg per tablet 1/3/2017 at pm Transfer Papers No Yes   Sig: Take 1-2 Tabs by mouth every four (4) hours as needed. Max Daily Amount: 12 Tabs. potassium chloride SR (K-TAB) 20 mEq tablet 2017 at pm Transfer Papers No Yes   Sig: Take 2 Tabs by mouth three (3) times daily.  Indications: HYPOKALEMIA PREVENTION   senna-docusate (PERICOLACE) 8.6-50 mg per tablet 2017 at hs Transfer Papers No Yes   Sig: Take 1 Tab by mouth two (2) times a day. simvastatin (ZOCOR) 20 mg tablet 1/12/2017 at hs Transfer Papers Yes Yes   Sig: Take 20 mg by mouth nightly. spironolactone (ALDACTONE) 50 mg tablet 1/12/2017 at am Transfer Papers No Yes   Sig: Take 1 Tab by mouth daily.  Indications: Chronic Heart Failure      Facility-Administered Medications: None       Hospital Medications:  Current Facility-Administered Medications   Medication Dose Route Frequency    insulin glargine (LANTUS) injection 15 Units  15 Units SubCUTAneous DAILY    cefTRIAXone (ROCEPHIN) 1 g in 0.9% sodium chloride (MBP/ADV) 50 mL  1 g IntraVENous Q12H    albuterol-ipratropium (DUO-NEB) 2.5 MG-0.5 MG/3 ML  3 mL Nebulization Q4H PRN    dextrose 5% and 0.9% NaCl infusion  75 mL/hr IntraVENous CONTINUOUS    sodium chloride (NS) flush 5-10 mL  5-10 mL IntraVENous PRN    sodium chloride (NS) flush 5-10 mL  5-10 mL IntraVENous Q8H    sodium chloride (NS) flush 5-10 mL  5-10 mL IntraVENous PRN    acetaminophen (TYLENOL) tablet 650 mg  650 mg Oral Q4H PRN    ondansetron (ZOFRAN) injection 4 mg  4 mg IntraVENous Q4H PRN    heparin (porcine) injection 5,000 Units  5,000 Units SubCUTAneous Q8H    sodium chloride (NS) flush 5-10 mL  5-10 mL IntraVENous Q8H    sodium chloride (NS) flush 5-10 mL  5-10 mL IntraVENous PRN    aspirin delayed-release tablet 81 mg  81 mg Oral DAILY    escitalopram oxalate (LEXAPRO) tablet 20 mg  20 mg Oral DAILY    levothyroxine (SYNTHROID) tablet 50 mcg  50 mcg Oral ACB    methylphenidate (RITALIN) tablet 10 mg  10 mg Oral DAILY    midodrine (PROAMITINE) tablet 10 mg  10 mg Oral TID    therapeutic multivitamin (THERAGRAN) tablet 1 Tab  1 Tab Oral DAILY    nystatin (MYCOSTATIN) 100,000 unit/gram ointment   Topical TID    senna-docusate (PERICOLACE) 8.6-50 mg per tablet 1 Tab  1 Tab Oral BID    simvastatin (ZOCOR) tablet 20 mg  20 mg Oral QHS    oxyCODONE-acetaminophen (PERCOCET) 5-325 mg per tablet 1 Tab  1 Tab Oral Q4H PRN    Or    oxyCODONE-acetaminophen (PERCOCET) 5-325 mg per tablet 2 Tab  2 Tab Oral Q4H PRN          OBJECTIVE       Laboratory and Imaging have been reviewed and are notable for      ECG:  Date:  normal EKG, normal sinus rhythm, unchanged from previous tracings      Diagnostic Tests:     No results for input(s): CPK, CKMB, TROIQ in the last 72 hours. No lab exists for component: CKQMB, CPKMB  Recent Labs      01/13/17   1649  01/13/17   1214  01/13/17   1040  01/13/17   0445   NA   --   137   --   140   K  4.8  6.0*   --   6.7*   CO2   --   26   --   27   BUN   --   93*   --   87*   CREA   --   1.35*   --   1.33*   GLU   --   87   --   252*   MG   --    --    --   2.8*   WBC   --   17.0*  16.4*  13.4*   HGB   --   12.3  12.0  12.3   HCT   --   40.8  39.7  40.6   PLT   --   805*  805*  854*         Cardiac work up to date:    TTE 11/10/16: LV mildly dilated. Mod. Reduced systolic fxn. EF 40-50%. Moderate diffuse hypokinesis- features c/w a pseudonormal left ventricular filling pattern, with concomitant abnormal relaxation and increased filling pressure (grade 2 diastolic dysfunction). LA mildly dilated. Mild MR. (11/14/16)  EF calculated by contrast  ventriculography was 30 %. --  CORONARY CIRCULATION:  --  Mid left main: The vessel was large sized. There was a 95 % stenosis. --  Proximal LAD: There was a 85 % stenosis. --  Right PDA: The vessel was small to medium sized. There was a 80 %  stenosis in the middle third of the vessel segment. CABG (11/10/16):  1. Coronary artery bypass graft x3 (left internal mammary artery to left   anterior descending, saphenous vein graft to obtuse marginal-1,   saphenous vein graft to right coronary artery).                 Social History:  Social History   Substance Use Topics    Smoking status: Never Smoker    Smokeless tobacco: Not on file    Alcohol use No       Family History:  Family History   Problem Relation Age of Onset    Heart Disease Mother     COPD Mother     Breast Cancer Maternal Aunt        Review of Symptoms:  A comprehensive review of systems was negative except for that written in the HPI. Physical Exam:      Visit Vitals    /70 (BP 1 Location: Right arm, BP Patient Position: At rest;Sitting)    Pulse 94    Temp 98.3 °F (36.8 °C)    Resp 20    Ht 5' (1.524 m)    Wt 113 lb 6.4 oz (51.4 kg)    SpO2 100%    Breastfeeding No    BMI 22.15 kg/m2     General Appearance:  Well developed, well nourished,alert and oriented x 3, and individual in no acute distress. Ears/Nose/Mouth/Throat:   Hearing grossly normal.Normal oral mucosa,no scleral icterus     Neck: Supple no JVD or bruits,no cervical lymphadenopathy   Chest:   Lungs clear to auscultation bilaterally,no rales rhonchi or wheezing   Cardiovascular:  Regular rate and rhythm, S1, S2 normal,  Murmur. PMI nondisplaced   Abdomen:   Soft, non-tender, bowel sounds are active. No abdominal bruits   Extremities: No edema bilaterally. Pulses detected, no varicosities   Skin: Warm and dry. No bruising   Neuro:                                                             I have discussed the diagnosis with the patient and the intended plan as seen in the above orders. Questions were answered concerning future plans. I have discussed medication side effects and warnings with the patient as well. Lavinia Rodgers is in agreement to the plan listed above and wishes to proceed. she  was instructed not to smoke, eat heart healthy diet  and to exercise. Thank you for this consult.       Adrianna Bethea MD

## 2017-01-14 NOTE — PROGRESS NOTES
Dual RN assessment done per writer and Doe Davies RN. Noted no broken skin but red rash in bilateral groin, across back of torso and on dorsal aspect of bilateral feet.

## 2017-01-14 NOTE — PROGRESS NOTES
Problem: Mobility Impaired (Adult and Pediatric)  Goal: *Acute Goals and Plan of Care (Insert Text)  Physical Therapy Goals  Initiated 1/14/2017  1. Patient will move from supine to sit and sit to supine and roll side to side in bed with moderate assistance x 2 within 7 day(s). 2. Patient will sustain sitting balance for one minute edge of bed with SBA x 2 within 7 days. 3. Patient will tolerate sitting edge of bed x 10 minutes prop sitting within 7 days. 4. Patient will demonstrate sit<>stand with moderate assistance x 2 facilitating use of LUE and LLE - hip and knee extension with left foot flat WBAT within 7 days. 5. Patient will tolerate AAROM and AROM exercises x 4 extremities in supine with moderate assist and cues within 7 days. PHYSICAL THERAPY EVALUATION  Patient: Velma Gallo (81 y.o. female)  Date: 1/14/2017  Primary Diagnosis: Sepsis ([address]        Precautions:   Aspiration, Bed Alarm, Skin, Sternal      ASSESSMENT :  Ms. Christal Chaney is an unfortunate 68 yo female who has multiple medical complexities since sustaining CVA after cardiac cath in 11/2016. Prior to 11/2016 patient was completely independent with h/o CAD and recent CABG. Patient has had repeated admissions to hospital and inpatient rehab at [facility] and [facility] inpatient. Patient admitted as transfer from [facility] back to [facility] on   1/13/17 for sepsis. Patient has a hive like rash all over her entire body. She has harding cathter and peg tube. Patient with left hemiparesis from CVA and limited AAROM of right UE and LE from recent prolonged bedrest. Severely decreased strength throughout, severe debility, poor sitting balance, complains of pain all over, and incontinent of bowels. Patient keep eyes clenched shut and appears in pain at rest and throughout PT. Patient requiring total to maximum assistance x 2 for bed mobility, sitting edge of bed, and attempt at stance.  Patient needed max total x 2 , person on each side and able to clear buttock off bed, half stance for few seconds. Facilitated use of LUE in weight bearing with sitting as well as facilitation techniques at left hip and knee for attempted stance. Facilitation techniques for AAROM LUE and LLE. Patient will benefit from skilled intervention to address the above impairments. Patient instrucuted in use of call bell but unsure of carryover. Three bed rails up and bed alarm activated for fall prevention. Patient has long course of rehab ahead. Patients rehabilitation potential is considered to be Poor -   Factors which may influence rehabilitation potential include:   [ ]         None noted  [X]         Mental ability/status  [X]         Medical condition  [X]         Home/family situation and support systems  [X]         Safety awareness  [X]         Pain tolerance/management  [ ]         Other:        PLAN :  Recommendations and Planned Interventions:  [X]           Bed Mobility Training             [X]    Neuromuscular Re-Education  [X]           Transfer Training                   [ ]    Orthotic/Prosthetic Training  [ ]           Gait Training                         [ ]    Modalities  [X]           Therapeutic Exercises           [ ]    Edema Management/Control  [X]           Therapeutic Activities            [X]    Patient and Family Training/Education  [ ]           Other (comment):     Frequency/Duration: Patient will be followed by physical therapy  5 times a week to address goals. Discharge Recommendations: Rehab-  Further Equipment Recommendations for Discharge: TBD       SUBJECTIVE:   Patient stated I hurt all over. I cant stand it - I am having a terrible day      OBJECTIVE DATA SUMMARY:   HISTORY:    Past Medical History   Diagnosis Date    Chronic pain         hips and legs    Coronary artery disease involving native coronary artery of native heart with unstable angina pectoris ([facility] [facility] 11/14/2016    [facility]      GERD (gastroesophageal reflux disease)      Hypertension      On intra-aortic balloon pump assist 11/14/2016       Placed in cath lab; left leg    Other ill-defined conditions(799.89)         increased cholesterol    Other ill-defined conditions(799.89)         diverticulosis    Psychiatric disorder         anxiety    Stroke [facility])      Thyroid disease      Unspecified adverse effect of anesthesia         woke up early at end of bunionectomy     Past Surgical History   Procedure Laterality Date    Hx lap cholecystectomy        Hx urological           cytocele and rectocele repair    Hx heent           dental surgery - gum graft    Hx other surgical           colonoscopy x 2 - no polyps per pt    Hx orthopaedic           bilateral carpal tunnel    Hx orthopaedic           bilateral bunionectomy    Hx orthopaedic           partial hip replacement     Prior Level of Function/Home Situation: decline since 11/16, readmissions, dependent and bed bound  Personal factors and/or comorbidities impacting plan of care: see above [facility]: Private residence  # Steps to Enter: 3  One/Two Story Residence: One story  Living Alone: No  Support Systems: Child(per), Skilled nursing facility  Patient Expects to be Discharged to[de-identified] Rehabilitation facility  Current DME Used/Available at Home: [address] [facility] [place], [facility]     EXAMINATION/PRESENTATION/DECISION MAKING:   Critical Behavior:  Neurologic State: Alert, Appropriate for age, Eyes open spontaneously  Orientation Level: Oriented to person, Oriented to situation, Disoriented to place, Disoriented to time  Cognition: Appropriate for age attention/concentration, Appropriate safety awareness, Follows commands     Skin: soiled of bowel movement- assited RN with cleaning and changing bed linens  Strength:    Strength: Grossly decreased, non-functional                    Tone & Sensation:   Tone: Abnormal (Left UE and LE)              Sensation: Impaired               Range Of Motion:  AROM: Grossly decreased, non-functional           PROM: Grossly decreased, non-functional           Coordination:  Coordination: Grossly decreased, non-functional     Functional Mobility:  Bed Mobility:  Rolling: Total assistance;Assist x2  Supine to Sit: Total assistance;Assist x2  Sit to Supine: Total assistance;Assist x2  Scooting: Total assistance;Assist x2  Transfers:  Sit to Stand: Maximum assistance; Additional time;Assist x2 (able to clear buttock from bed)  Stand to Sit: Maximum assistance;Assist x2  Stand Pivot Transfers:  (N/a)                    Balance:   Sitting - Static: Poor (constant support)  Standing:  (n/a)  Ambulation/Gait Training:              Gait Description (WDL):  (non ambulatory at this time)                                                  Therapeutic Exercises:   AROM BUES as tolerated all planes. AROM RLE all planes supine, AAROM LLE     Functional Measure:  Barthel Index:      Bathin  Bladder: 0  Bowels: 0  Groomin  Dressin  Feedin  Mobility: 0  Stairs: 0  Toilet Use: 0  Transfer (Bed to Chair and Back): 0  Total: 5         Barthel and G-code impairment scale:  Percentage of impairment CH  0% CI  1-19% CJ  20-39% CK  40-59% CL  60-79% CM  80-99% CN  100%   Barthel Score 0-100 100 99-80 79-60 59-40 20-39 1-19    0   Barthel Score 0-20 20 17-19 13-16 9-12 5-8 1-4 0      The Barthel ADL Index: Guidelines  1. The index should be used as a record of what a patient does, not as a record of what a patient could do. 2. The main aim is to establish degree of independence from any help, physical or verbal, however minor and for whatever reason. 3. The need for supervision renders the patient not independent. 4. A patient's performance should be established using the best available evidence. Asking the patient, friends/relatives and nurses are the usual sources, but direct observation and common sense are also important. However direct testing is not needed.   5. Usually the patient's performance over the preceding 24-48 hours is important, but occasionally longer periods will be relevant. 6. Middle categories imply that the patient supplies over 50 per cent of the effort. 7. Use of aids to be independent is allowed. Adarsh Pham., Barthel, D.W. (2682). Functional evaluation: the Barthel Index. [facility] (14)2. Lucinda Oro alicia ROMY Moreau, Coty Crespo., Eunice Pearl., [facility], [address] (1999). Measuring the change indisability after inpatient rehabilitation; comparison of the responsiveness of the Barthel Index and Functional [place] Measure. Journal of Neurology, Neurosurgery, and Psychiatry, 66(4), 364-452. KATERIN See, ELEAZAR Ortiz, & Diana Perales M.A. (2004.) Assessment of post-stroke quality of life in cost-effectiveness studies: The usefulness of the Barthel Index and the EuroQoL-5D. Quality of Life Research, 13, 430-37         G codes: In compliance with CMSs Claims Based Outcome Reporting, the following G-code set was chosen for this patient based on their primary functional limitation being treated: The outcome measure chosen to determine the severity of the functional limitation was the Barthel Index with a score of 5/100 which was correlated with the impairment scale.       · Mobility - Walking and Moving Around:               - CURRENT STATUS:    CM - 80%-99% impaired, limited or restricted               - GOAL STATUS:           CL - 60%-79% impaired, limited or restricted               - D/C STATUS:                       ---------------To be determined---------------      Physical Therapy Evaluation Charge Determination   History Examination Presentation Decision-Making   HIGH Complexity :3+ comorbidities / personal factors will impact the outcome/ POC  HIGH Complexity : 4+ Standardized tests and measures addressing body structure, function, activity limitation and / or participation in recreation  HIGH Complexity : Unstable and unpredictable characteristics  Other outcome measures Barthel Index HIGH       Based on the above components, the patient evaluation is determined to be of the following complexity level: HIGH      Pain:  Pain Scale 1: Numeric (0 - 10)  Pain Intensity 1: 0              Activity Tolerance:   poor  Please refer to the flowsheet for vital signs taken during this treatment. After treatment:   [ ]         Patient left in no apparent distress sitting up in chair  [X]         Patient left in no apparent distress in bed  [X]         Call bell left within reach  [X]         Nursing notified  [ ]         Caregiver present  [X]         Bed alarm activated      COMMUNICATION/EDUCATION:   The patients plan of care was discussed with: Registered Nurse. [ ]         Fall prevention education was provided and the patient/caregiver indicated understanding. [ ]         Patient/family have participated as able in goal setting and plan of care. [ ]         Patient/family agree to work toward stated goals and plan of care. [ ]         Patient understands intent and goals of therapy, but is neutral about his/her participation. [X]         Patient is unable to participate in goal setting and plan of care.      Thank you for this referral.  Darrin Thornton, PT   Time Calculation: 48 mins

## 2017-01-14 NOTE — ROUTINE PROCESS
TRANSFER - OUT REPORT:    Verbal report given to Amina Bah RN (name) on [facility]  being transferred to [facility] (unit) for routine progression of care       Report consisted of patients Situation, Background, Assessment and   Recommendations(SBAR). Information from the following report(s) SBAR, ED Summary, Intake/Output, MAR, Recent Results and Cardiac Rhythm SR was reviewed with the receiving nurse. Lines:   Peripheral IV 01/13/17 Left Wrist (Active)   Site Assessment Clean, dry, & intact 1/13/2017  3:27 PM   Phlebitis Assessment 0 1/13/2017  3:27 PM   Infiltration Assessment 0 1/13/2017  3:27 PM   Dressing Status Clean, dry, & intact 1/13/2017  3:27 PM   Dressing Type Transparent 1/13/2017  3:27 PM       Peripheral IV 01/13/17 Left Arm (Active)   Site Assessment Clean, dry, & intact 1/13/2017  4:28 PM   Phlebitis Assessment 0 1/13/2017  4:28 PM   Infiltration Assessment 0 1/13/2017  4:28 PM   Dressing Status Clean, dry, & intact 1/13/2017  4:28 PM   Dressing Type Transparent 1/13/2017  4:28 PM        Opportunity for questions and clarification was provided.       Patient transported with:   Registered Nurse

## 2017-01-14 NOTE — CONSULTS
[address] [facility]  YOB: 1940          Assessment & Plan:   ARF  · Likely pre-renal related to cardiac disease, diuresis, intake  · Has received IVF. Will hold and recheck labs    Hyperkalemia  · Due to ARF, KCL, aldactone  · Better today  · Was hypokalemic with diuresis during recent admission to [facility]. KCL and aldactone started at that time. CM/CHF  · Recent CABG  · EF 15%    DM  · Insulin       Subjective:   CC: Hyperkalemia, ARF  HPI: Patient followed by Dr. Seamus Anne recently at [facility] for renal dysfunction and diuresis. She has a recent CABG complicated by CVA and readmission for CHF. Diuresis was complicated by hypokalemia so she was treated with KCL and aldactone. Admitted yesterday from [facility] with elevated Cr and hyperkalemia. Has improved with IVF.    ROS: a bit drowsy, had low blood sugar this am. Denies SOB/n/v  Current Facility-Administered Medications   Medication Dose Route Frequency    insulin glargine (LANTUS) injection 15 Units  15 Units SubCUTAneous DAILY    dextrose 5% and 0.9% NaCl infusion  75 mL/hr IntraVENous CONTINUOUS    sodium chloride (NS) flush 5-10 mL  5-10 mL IntraVENous PRN    sodium chloride (NS) flush 5-10 mL  5-10 mL IntraVENous Q8H    sodium chloride (NS) flush 5-10 mL  5-10 mL IntraVENous PRN    acetaminophen (TYLENOL) tablet 650 mg  650 mg Oral Q4H PRN    ondansetron (ZOFRAN) injection 4 mg  4 mg IntraVENous Q4H PRN    heparin (porcine) injection 5,000 Units  5,000 Units SubCUTAneous Q8H    cefTRIAXone (ROCEPHIN) 1 g in 0.9% sodium chloride (MBP/ADV) 50 mL  1 g IntraVENous Q24H    sodium chloride (NS) flush 5-10 mL  5-10 mL IntraVENous Q8H    sodium chloride (NS) flush 5-10 mL  5-10 mL IntraVENous PRN    aspirin delayed-release tablet 81 mg  81 mg Oral DAILY    escitalopram oxalate (LEXAPRO) tablet 20 mg  20 mg Oral DAILY    levothyroxine (SYNTHROID) tablet 50 mcg  50 mcg Oral ACB    methylphenidate (RITALIN) tablet 10 mg  10 mg Oral DAILY    midodrine (PROAMITINE) tablet 10 mg  10 mg Oral TID    therapeutic multivitamin (THERAGRAN) tablet 1 Tab  1 Tab Oral DAILY    nystatin (MYCOSTATIN) 100,000 unit/gram ointment   Topical TID    senna-docusate (PERICOLACE) 8.6-50 mg per tablet 1 Tab  1 Tab Oral BID    simvastatin (ZOCOR) tablet 20 mg  20 mg Oral QHS    oxyCODONE-acetaminophen (PERCOCET) 5-325 mg per tablet 1 Tab  1 Tab Oral Q4H PRN    Or    oxyCODONE-acetaminophen (PERCOCET) 5-325 mg per tablet 2 Tab  2 Tab Oral Q4H PRN          Objective:     Vitals:  Blood pressure 110/56, pulse 90, temperature 98.4 °F (36.9 °C), resp. rate 25, height 5' (1.524 m), weight 51.4 kg (113 lb 6.4 oz), SpO2 99 %, not currently breastfeeding. Temp (24hrs), Av.5 °F (36.9 °C), Min:98.1 °F (36.7 °C), Max:98.9 °F (37.2 °C)      Intake and Output:      1901 -  0700  In: 666 [I.V.:596]  Out: 800 [Urine:800]    Physical Exam:               GENERAL ASSESSMENT: NAD  HEENT:Nontraumatic   CHEST: CTA  HEART: S1S2  ABDOMEN: Soft,NT  : +Boles  EXTREMITY: no EDEMA          ECG/rhythm:    Data Review      No results for input(s): TNIPOC in the last 72 hours. No lab exists for component: ITNL   No results for input(s): CPK, CKMB, TROIQ in the last 72 hours. Recent Labs      17   1649  17   1214  17   1040  17   0445   NA   --   137   --   140   K  4.8  6.0*   --   6.7*   CL   --   100   --   103   CO2   --   26   --   27   BUN   --   93*   --   87*   CREA   --   1.35*   --   1.33*   GLU   --   87   --   252*   MG   --    --    --   2.8*   CA   --   9.6   --   9.9   ALB   --   3.4*   --   3.5   WBC   --   17.0*  16.4*  13.4*   HGB   --   12.3  12.0  12.3   HCT   --   40.8  39.7  40.6   PLT   --   805*  805*  854*      No results for input(s): INR, PTP, APTT in the last 72 hours.     No lab exists for component: INREXT  Needs: urine analysis, urine sodium, protein and creatinine  No results found for: Laurence Perez        : Mya Varghese MD  1/14/2017        [facility]:  [email or web address]  [place] office:  [address] [address]  [address]  Phone: [phone]  Fax :     [phone]    [place] office:  [address] [facility] [facility] [address]  Phone - [phone]  Fax - [phone]

## 2017-01-14 NOTE — PROGRESS NOTES
Corwin Galindo [facility] 79  [address] [facility], [address] [place] [address]  [phone]      Medical Progress Note      NAME: Felicity Burkett   :  1940  MRM:  [id number]    Date/Time: 2017         Subjective:     Chief Complaint:  Patient was seen and examined by me. Chart reviewed. Patient feeling slightly better. Son stated that she looks more awake       Objective:       Vitals:       Last 24hrs VS reviewed since prior progress note. Most recent are:    Visit Vitals    /70 (BP 1 Location: Right arm, BP Patient Position: At rest;Sitting)    Pulse 94    Temp 98.3 °F (36.8 °C)    Resp 20    Ht 5' (1.524 m)    Wt 51.4 kg (113 lb 6.4 oz)    SpO2 100%    Breastfeeding No    BMI 22.15 kg/m2     SpO2 Readings from Last 6 Encounters:   17 100%   17 100%   16 98%   14 99%   11 98%          Intake/Output Summary (Last 24 hours) at 17 0932  Last data filed at 17 0544   Gross per 24 hour   Intake              666 ml   Output              800 ml   Net             -134 ml        Exam:     Physical Exam:    Gen:  Disheveled, chronically ill appearing, frail, NAD  HEENT:  Pink conjunctivae, PERRL, hearing intact to voice, moist mucous membranes  Neck:  Supple, without masses, thyroid non-tender  Resp:  No accessory muscle use, some rhonchi at bases  Card:  No murmurs, normal S1, S2 without thrills, trace edema.   Chest scar c/d/i  Abd:  Soft, non-tender, non-distended, normoactive bowel sounds are present  Musc:  No cyanosis or clubbing  Skin:  Erythematous rash on chest/abd  Neuro:  Cranial nerves 3-12 are grossly intact, chronic L hemiplegia  Psych:  poor insight, oriented to person    Medications Reviewed: (see below)    Lab Data Reviewed: (see below)    ______________________________________________________________________    Medications:     Current Facility-Administered Medications   Medication Dose Route Frequency    insulin glargine (LANTUS) injection 15 Units  15 Units SubCUTAneous DAILY    dextrose 5% and 0.9% NaCl infusion  75 mL/hr IntraVENous CONTINUOUS    cefTRIAXone (ROCEPHIN) 1 g in 0.9% sodium chloride (MBP/ADV) 50 mL  1 g IntraVENous Q12H    albuterol-ipratropium (DUO-NEB) 2.5 MG-0.5 MG/3 ML  3 mL Nebulization Q4H PRN    sodium chloride (NS) flush 5-10 mL  5-10 mL IntraVENous PRN    sodium chloride (NS) flush 5-10 mL  5-10 mL IntraVENous Q8H    sodium chloride (NS) flush 5-10 mL  5-10 mL IntraVENous PRN    acetaminophen (TYLENOL) tablet 650 mg  650 mg Oral Q4H PRN    ondansetron (ZOFRAN) injection 4 mg  4 mg IntraVENous Q4H PRN    heparin (porcine) injection 5,000 Units  5,000 Units SubCUTAneous Q8H    sodium chloride (NS) flush 5-10 mL  5-10 mL IntraVENous Q8H    sodium chloride (NS) flush 5-10 mL  5-10 mL IntraVENous PRN    aspirin delayed-release tablet 81 mg  81 mg Oral DAILY    escitalopram oxalate (LEXAPRO) tablet 20 mg  20 mg Oral DAILY    levothyroxine (SYNTHROID) tablet 50 mcg  50 mcg Oral ACB    methylphenidate (RITALIN) tablet 10 mg  10 mg Oral DAILY    midodrine (PROAMITINE) tablet 10 mg  10 mg Oral TID    therapeutic multivitamin (THERAGRAN) tablet 1 Tab  1 Tab Oral DAILY    nystatin (MYCOSTATIN) 100,000 unit/gram ointment   Topical TID    senna-docusate (PERICOLACE) 8.6-50 mg per tablet 1 Tab  1 Tab Oral BID    simvastatin (ZOCOR) tablet 20 mg  20 mg Oral QHS    oxyCODONE-acetaminophen (PERCOCET) 5-325 mg per tablet 1 Tab  1 Tab Oral Q4H PRN    Or    oxyCODONE-acetaminophen (PERCOCET) 5-325 mg per tablet 2 Tab  2 Tab Oral Q4H PRN          Lab Review:     Recent Labs      01/13/17   1214  01/13/17   1040  01/13/17   0445   WBC  17.0*  16.4*  13.4*   HGB  12.3  12.0  12.3   HCT  40.8  39.7  40.6   PLT  805*  805*  854*     Recent Labs      01/13/17   1649  01/13/17   1214  01/13/17   0445   NA   --   137  140   K  4.8  6.0*  6.7*   CL   --   100  103   CO2   --   26  27   GLU   --   87  252* BUN   --   93*  87*   CREA   --   1.35*  1.33*   CA   --   9.6  9.9   MG   --    --   2.8*   ALB   --   3.4*  3.5   TBILI   --   0.5  0.5   SGOT   --   42*  33   ALT   --   54  58     Lab Results   Component Value Date/Time    Glucose (POC) 90 01/14/2017 08:59 AM    Glucose (POC) 78 01/14/2017 08:45 AM    Glucose (POC) 73 01/14/2017 08:28 AM    Glucose (POC) 71 01/14/2017 08:27 AM    Glucose (POC) 127 01/12/2017 11:36 AM          Assessment / Plan:     Principal Problem:    68 yo hx of HTN, DM, recent CAD s/p CABG, ICM EF 15%, CVA w/ L hemiplegia, presented w/ AMS, sepsis, PADMINI, hyperkalemia    1) Acute encephalopathy: likely due to sepsis. Now slowly improving. Head CT neg. Will cont to monitor    2) Sepsis/UTI: sepsis POA. Patient with frequent UTI. Will increase IV CTX to bid. Monitor cultures    3) Lactic acidosis: due to sepsis, dehydration. Cont IVF, monitor lactate    4) PADMINI: likely from diuresis, pre-renal.  Renal is following. Holding diuretics, aldactone. Monitor BMP    5) Hyperkalemia: due to PADMINI, aldactone. Now resolved with kayexalate. Cont to hold aldactone, monitor    6) CAD/chronic syst CHF: EF 15%. Volume stable. Holding bumex, aldactone, metolazone. 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 Cont ASA, statin    8) Hypotension: cont midodrine    9) DM type 2 w/ renal complications: Q1V pending. Issues with hypoglycemia. Will d/c NPH.   Start Lantus, SSI    10) Candidiasis: cont nystatin    Code: DNR     Total time spent with patient: 39 [address] [facility] discussed with: Family, patient, nursing    Discussed:  Care Plan    Prophylaxis:  Hep SQ    Disposition:  SAH/Rehab           ___________________________________________________    Attending Physician: Davy Goldsmith MD

## 2017-01-15 PROBLEM — A49.1 VRE (VANCOMYCIN-RESISTANT ENTEROCOCCI) INFECTION: Status: ACTIVE | Noted: 2017-01-01

## 2017-01-15 PROBLEM — Z16.21 VRE (VANCOMYCIN-RESISTANT ENTEROCOCCI) INFECTION: Status: ACTIVE | Noted: 2017-01-01

## 2017-01-15 NOTE — PROGRESS NOTES
Progress Note                                        Mark A. Sandria Moritz, MD, [facility] [address], [place], [address] [place] [address] N[place]                                                 Phone [phone]; Fax [phone]        2017 10:29 PM     Admit Date:           2017  Admit Diagnosis:  Sepsis ([address]  :          1940   MRN:          [id number]   ASSESSMENT/RECOMMENDATION:   1)CAD/S/P CABG complicated by CVA and one admission after bypass for CHF   -watch I/O's closely with electrolytes     2) Ischemic CM with EF by cath of 30%  -will need most likely ACE-I, Beta blockade at some point   -yesterday EF 25% and not volume overloaded at this time  -DNR therefore presume no  AICD    3) Hyperkalemia on spironocatone and potassium  -resoving        WBC 17.8K, creatinine 1.35 ( up slightly), K 4.8, TSH 0.46, CXR no acute cardiopulmonary disease, ECG normal sinus with LAE and nonspecific ST-T changes  Pro-BNP yesterday 9938     She was on spironolactone, Bumex and zaroxolyn based on PTA med's. Agree with holding diuretics until re equilibrates. May need diuretics again but gently add back. Last 3 Recorded Weights in this Encounter    17 1202 17 2300 17 0532   Weight: 106 lb 14.8 oz (48.5 kg) 114 lb (51.7 kg) 113 lb 6.4 oz (51.4 kg)          0701 -  1900  In: 1501 [I.V.:1431]  Out: 800 [Urine:800]    SUBJECTIVE           Crissy Blue is a 67 y.o. female I am seeing for diabetes, hypertension, hyperlipidemia, coronary artery disease and status post CABG. Symptoms include: confusion Cardiac risk factors: dyslipidemia, diabetes mellitus, sedentary life style, hypertension, post-menopausal. Ms. Fatemeh Blue has had a rough course after her CABG. She had ner CABG in nov. And then had a CVA and was readmitted to [facility]. She was admitted to [address] [place] [address] and developed confusion and was transferred to [facility]. Her Postassium was elevated. She was discharged a couple of days ago after admission for CHF.      Harpal Koenig is resting with eyes closed during the exam..      Current Facility-Administered Medications   Medication Dose Route Frequency    insulin glargine (LANTUS) injection 15 Units  15 Units SubCUTAneous DAILY    cefTRIAXone (ROCEPHIN) 1 g in 0.9% sodium chloride (MBP/ADV) 50 mL  1 g IntraVENous Q12H    albuterol-ipratropium (DUO-NEB) 2.5 MG-0.5 MG/3 ML  3 mL Nebulization Q4H PRN    dextrose 5% - 0.45% NaCl with KCl 20 mEq/L infusion  75 mL/hr IntraVENous CONTINUOUS    sodium chloride (NS) flush 5-10 mL  5-10 mL IntraVENous PRN    sodium chloride (NS) flush 5-10 mL  5-10 mL IntraVENous Q8H    sodium chloride (NS) flush 5-10 mL  5-10 mL IntraVENous PRN    acetaminophen (TYLENOL) tablet 650 mg  650 mg Oral Q4H PRN    ondansetron (ZOFRAN) injection 4 mg  4 mg IntraVENous Q4H PRN    heparin (porcine) injection 5,000 Units  5,000 Units SubCUTAneous Q8H    sodium chloride (NS) flush 5-10 mL  5-10 mL IntraVENous Q8H    sodium chloride (NS) flush 5-10 mL  5-10 mL IntraVENous PRN    aspirin delayed-release tablet 81 mg  81 mg Oral DAILY    escitalopram oxalate (LEXAPRO) tablet 20 mg  20 mg Oral DAILY    levothyroxine (SYNTHROID) tablet 50 mcg  50 mcg Oral ACB    methylphenidate (RITALIN) tablet 10 mg  10 mg Oral DAILY    midodrine (PROAMITINE) tablet 10 mg  10 mg Oral TID    therapeutic multivitamin (THERAGRAN) tablet 1 Tab  1 Tab Oral DAILY    nystatin (MYCOSTATIN) 100,000 unit/gram ointment   Topical TID    senna-docusate (PERICOLACE) 8.6-50 mg per tablet 1 Tab  1 Tab Oral BID    simvastatin (ZOCOR) tablet 20 mg  20 mg Oral QHS    oxyCODONE-acetaminophen (PERCOCET) 5-325 mg per tablet 1 Tab  1 Tab Oral Q4H PRN    Or    oxyCODONE-acetaminophen (PERCOCET) 5-325 mg per tablet 2 Tab  2 Tab Oral Q4H PRN      OBJECTIVE Intake/Output Summary (Last 24 hours) at 01/14/17 2229  Last data filed at 01/14/17 0844   Gross per 24 hour   Intake             1501 ml   Output              600 ml   Net              901 ml         Review of Systems -as per HPI      PHYSICAL EXAM        Visit Vitals    BP 97/59    Pulse 92    Temp 97.7 °F (36.5 °C)    Resp 15    Ht 5' (1.524 m)    Wt 113 lb 6.4 oz (51.4 kg)    SpO2 100%    Breastfeeding No    BMI 22.15 kg/m2       Gen: Sleeping during exam    Resp: No accessory muscle use, clear anterior  Card: Regular Rate,Rythm,. MSK: No cyanosis or clubbing, good capillary refill  Skin: No rashes or ulcers, no bruising  Neuro:  Unable to assess  Psych:  Unable to assess  Peg in for feeding I presume after CVA      DATA REVIEW            Laboratory and Imaging have been reviewed by me and are notable for  No results for input(s): CPK, CKMB, TROIQ in the last 72 hours.   Recent Labs      01/14/17   1100  01/13/17   1649  01/13/17   1214  01/13/17   1040  01/13/17   0445   NA  148*   --   137   --   140   K  2.6*  4.8  6.0*   --   6.7*   CO2  28   --   26   --   27   BUN  68*   --   93*   --   87*   CREA  1.16*   --   1.35*   --   1.33*   GLU  85   --   87   --   252*   PHOS  4.8*   --    --    --    --    MG  2.2   --    --    --   2.8*   WBC  14.0*   --   17.0*  16.4*  13.4*   HGB  9.8*   --   12.3  12.0  12.3   HCT  33.0*   --   40.8  39.7  40.6   PLT  676*   --   805*  805*  854*             Julia Reich MD

## 2017-01-15 NOTE — PROGRESS NOTES
Corwin Galindo [facility] 79  [address] [facility], [address] [place] [address]  [phone]      Medical Progress Note      NAME: Lynette Alcala   :  1940  MRM:  [id number]    Date/Time: 1/15/2017         Subjective:     Chief Complaint:  Patient was seen and examined by me. Chart reviewed. Patient with worsening abd pain today       Objective:       Vitals:       Last 24hrs VS reviewed since prior progress note. Most recent are:    Visit Vitals    /53 (BP 1 Location: Left arm, BP Patient Position: At rest;Supine)    Pulse 99    Temp 98.7 °F (37.1 °C)    Resp 16    Ht 5' (1.524 m)    Wt 53.6 kg (118 lb 3.2 oz)    SpO2 100%    Breastfeeding No    BMI 23.08 kg/m2     SpO2 Readings from Last 6 Encounters:   01/15/17 100%   17 100%   16 98%   14 99%   11 98%            Intake/Output Summary (Last 24 hours) at 01/15/17 0928  Last data filed at 01/15/17 1795   Gross per 24 hour   Intake           1377.5 ml   Output              700 ml   Net            677.5 ml        Exam:     Physical Exam:    Gen:  Disheveled, chronically ill appearing, frail, mod distress  HEENT:  Pink conjunctivae, PERRL, hearing intact to voice, moist mucous membranes  Neck:  Supple, without masses, thyroid non-tender  Resp:  No accessory muscle use, some rhonchi at bases  Card:  No murmurs, normal S1, S2 without thrills, trace edema.   Chest scar c/d/i  Abd:  Soft, diffused abd pain, non-distended, PEG c/d/i  Musc:  No cyanosis or clubbing  Skin:  Diffused macular papular rash  Neuro:  Cranial nerves 3-12 are grossly intact, chronic L hemiplegia  Psych:  poor insight, oriented to person    Medications Reviewed: (see below)    Lab Data Reviewed: (see below)    ______________________________________________________________________    Medications:     Current Facility-Administered Medications   Medication Dose Route Frequency    dextrose 5% with KCl 20 mEq/L infusion   IntraVENous CONTINUOUS    potassium chloride (KLOR-CON) packet 20 mEq  20 mEq Oral BID WITH MEALS    HYDROmorphone (DILAUDID) injection 0.5 mg  0.5 mg IntraVENous Q3H PRN    insulin glargine (LANTUS) injection 15 Units  15 Units SubCUTAneous DAILY    albuterol-ipratropium (DUO-NEB) 2.5 MG-0.5 MG/3 ML  3 mL Nebulization Q4H PRN    sodium chloride (NS) flush 5-10 mL  5-10 mL IntraVENous PRN    sodium chloride (NS) flush 5-10 mL  5-10 mL IntraVENous Q8H    sodium chloride (NS) flush 5-10 mL  5-10 mL IntraVENous PRN    acetaminophen (TYLENOL) tablet 650 mg  650 mg Oral Q4H PRN    ondansetron (ZOFRAN) injection 4 mg  4 mg IntraVENous Q4H PRN    heparin (porcine) injection 5,000 Units  5,000 Units SubCUTAneous Q8H    sodium chloride (NS) flush 5-10 mL  5-10 mL IntraVENous Q8H    sodium chloride (NS) flush 5-10 mL  5-10 mL IntraVENous PRN    aspirin delayed-release tablet 81 mg  81 mg Oral DAILY    escitalopram oxalate (LEXAPRO) tablet 20 mg  20 mg Oral DAILY    levothyroxine (SYNTHROID) tablet 50 mcg  50 mcg Oral ACB    methylphenidate (RITALIN) tablet 10 mg  10 mg Oral DAILY    midodrine (PROAMITINE) tablet 10 mg  10 mg Oral TID    therapeutic multivitamin (THERAGRAN) tablet 1 Tab  1 Tab Oral DAILY    nystatin (MYCOSTATIN) 100,000 unit/gram ointment   Topical TID    senna-docusate (PERICOLACE) 8.6-50 mg per tablet 1 Tab  1 Tab Oral BID    simvastatin (ZOCOR) tablet 20 mg  20 mg Oral QHS    oxyCODONE-acetaminophen (PERCOCET) 5-325 mg per tablet 1 Tab  1 Tab Oral Q4H PRN          Lab Review:     Recent Labs      01/15/17   0423  01/14/17   1100  01/13/17   1214   WBC  18.9*  14.0*  17.0*   HGB  11.0*  9.8*  12.3   HCT  37.0  33.0*  40.8   PLT  718*  676*  805*     Recent Labs      01/15/17   0423  01/14/17   1100  01/13/17   1649  01/13/17   1214  01/13/17   0445   NA  152*  148*   --   137  140   K  2.9*  2.6*  4.8  6.0*  6.7*   CL  112*  111*   --   100  103   CO2  28  28   --   26  27   GLU  92  85   --   87 252*   BUN  62*  68*   --   93*  87*   CREA  1.30*  1.16*   --   1.35*  1.33*   CA  8.8  8.6   --   9.6  9.9   MG  2.2  2.2   --    --   2.8*   PHOS  4.2  4.8*   --    --    --    ALB  2.5*   --    --   3.4*  3.5   TBILI  0.4   --    --   0.5  0.5   SGOT  29   --    --   42*  33   ALT  33   --    --   54  58     Lab Results   Component Value Date/Time    Glucose (POC) 106 01/15/2017 08:07 AM    Glucose (POC) 98 01/14/2017 09:06 PM    Glucose (POC) 103 01/14/2017 04:38 PM    Glucose (POC) 82 01/14/2017 11:35 AM    Glucose (POC) 90 01/14/2017 08:59 AM          Assessment / Plan:     Principal Problem:    66 yo hx of HTN, DM, recent CAD s/p CABG, ICM EF 15%, CVA w/ L hemiplegia, presented w/ AMS, sepsis, PADMINI, hyperkalemia    1) Acute encephalopathy: likely due to sepsis. Slowly improving. Head CT neg. Will cont to monitor    2) Sepsis/possible UTI: sepsis POA, still present. Patient with frequent UTI. Prelim UCx cultures with VRE. Given drug rash, will d/c IV CTX. Will broaden IV abx to Linezolid/aztreonam/flagyl. Consult ID    3) Abd pain:  Unclear etiology. Given elevated WBC, lactate, will obtain an Abd/pelvis CT to r/o obstruction, ischemia. Keep NPO    4) Lactic acidosis: due to sepsis, dehydration. Increase IVF    5) PADMINI: likely from diuresis, pre-renal.  Renal is following. Holding diuretics, aldactone. Cont IVF, Monitor BMP    6) Hypernatremia: due to poor intake, diuretics. Will increase D5W, monitor    7) Hyperkalemia: due to PADMINI, aldactone. Now resolved. Cont to hold aldactone, monitor    8) CAD/chronic syst CHF: prior EF was 15%, but repeat echo with EF 25%. Volume stable. Holding bumex, aldactone, metolazone. Cards following    9) CVA: hx of a large R frontal CVA w/ associated L hemiplegia. Cont ASA, statin    10) Hypotension: cont midodrine    11) DM type 2 w/ renal complications: T6J pending. Issues with hypoglycemia. NPH was d/c.   Cont Lantus, SSI    12) Candidiasis: cont nystatin    13) Drug Rash: POA. Will d/c IV CTX.   Cont benadryl prn, monitor closely    Code: DNR     Total time spent with patient: 39 Minutes (patient is critically ill)                 Care Plan discussed with: Family, patient, nursing    Discussed:  Care Plan    Prophylaxis:  Hep SQ    Disposition:  SAH/Rehab           ___________________________________________________    Attending Physician: Davy Goldsmith MD

## 2017-01-15 NOTE — ROUTINE PROCESS
Bedside and Verbal shift change report given to Kamilah Fleming RN (oncoming nurse) by Ansley Franz RN (offgoing nurse). Report included the following information SBAR, Kardex, ED Summary, Intake/Output, MAR, Accordion, Recent Results, Cardiac Rhythm SR and Alarm Parameters .

## 2017-01-15 NOTE — PROGRESS NOTES
Bedside shift change report given to Jaret Diaz RN (oncoming nurse) by Penny Odell RN (offgoing nurse). Report included the following information SBAR, Kardex and MAR.

## 2017-01-15 NOTE — PROGRESS NOTES
[facility] Dosing Services: Antimicrobial Stewardship Progress Note    Consult for antibiotic dosing of Linezolid (TORB), Aztreonam (patient with diffuse rash of unknown origin), and Flagyl by Dr. Jeanette Schlatter  Pharmacist reviewed antibiotic appropriateness for 67year old , female  for indication of Sepsis of unknown etiology and VRE UTI  Day of Therapy 1    Plan:  Non-Kinetic Antimicrobial Dosing:   Current Regimen:  Linezolid 600mg twice daily, Aztreonam 1g every 8 hours, and Flagyl 500mg every 6 hours  Recommendation: Continue current regimen  Other Antimicrobial  (not dosed by pharmacist)   None   Cultures     1/13 Blood NG x 2 days  1/13 Urine Enterococcus faecium (VRE)   Serum Creatinine     Lab Results   Component Value Date/Time    Creatinine 1.30 01/15/2017 04:23 AM       Creatinine Clearance Estimated Creatinine Clearance: 26 mL/min (based on Cr of 1.3).      Temp   98.7 °F (37.1 °C)    WBC   Lab Results   Component Value Date/Time    WBC 18.9 01/15/2017 04:23 AM       H/H   Lab Results   Component Value Date/Time    HGB 11.0 01/15/2017 04:23 AM        Platelets   Lab Results   Component Value Date/Time    PLATELET 867 54/27/9097 04:23 AM          Sharla Baca, PharmD, BCPS  Contact information:

## 2017-01-15 NOTE — PROGRESS NOTES
Problem: Sepsis: Day 3  Goal: *Tolerating diet  Outcome: Not Progressing Towards Goal  NPO with ice chips

## 2017-01-15 NOTE — PROGRESS NOTES
Problem: Self Care Deficits Care Plan (Adult)  Goal: *Acute Goals and Plan of Care (Insert Text)  Occupational Therapy Goals  Initiated 1/15/2017  1. Patient will perform self-feeding with moderate assistance within 7 day(s). 2. Patient will maintain bilateral LEs in bridge position while wearing slipper socks without assist > or = 2 minutes without cues within 7 day(s). 3. Patient will demonstrate relaxed position with UEs outstretched, digits extended and feet relaxed (versus pushing into plantarflexion) with min cues for relaxation techniques within 7 day(s). 4. Patient will perform bilateral UE AAROM with min assist within 7 day(s). OCCUPATIONAL THERAPY EVALUATION  Patient: Papito Deutsch (47 y.o. female)  Date: 1/15/2017  Primary Diagnosis: Sepsis ([address]        Precautions:   Fall, Skin, Aspiration      ASSESSMENT :  Based on the objective data described below, the patient presents with fluctuation in arousal, holds eyes closed, did initially open eyes to cue, however at one point kept closed and demonstrated rapid eye movement with eyes rolled back, informed RN. On arrival holding face in whinced position, pushing feet into bed and hands fisted. Patient's son present and educated on PROM along with relaxation techniques, positioning to increase comfort and decrease tension, and prolonged UE and LE stretches to perform with patient. Recommend positioning LE in bridge to weight bear through feet as she pushes into plantarflexion against bed and educated her son on result of loss of dorsiflexion. Overall total assist all basic ADLs and mobility. Will continue to follow, recommend prolonged stretching all joints and increased weight bearing as able to prevent decreased ROM due to prolonged bedrest.      Patient will benefit from skilled intervention to address the above impairments.   Patients rehabilitation potential is considered to be Guarded  Factors which may influence rehabilitation potential include:   [ ]             None noted  [X]             Mental ability/status  [X]             Medical condition  [ ]             Home/family situation and support systems  [ ]             Safety awareness  [ ]             Pain tolerance/management  [ ]             Other:        PLAN :  Recommendations and Planned Interventions:  [X]               Self Care Training                  [X]        Therapeutic Activities  [X]               Functional Mobility Training    [X]        Cognitive Retraining  [X]               Therapeutic Exercises           [X]        Endurance Activities  [X]               Balance Training                   [X]        Neuromuscular Re-Education  [ ]               Visual/Perceptual Training     [X]   Home Safety Training  [X]               Patient Education                 [X]        Family Training/Education  [ ]               Other (comment):     Frequency/Duration: Patient will be followed by occupational therapy 5 times a week to address goals. Discharge Recommendations: Rehab  Further Equipment Recommendations for Discharge:        SUBJECTIVE:   Patient stated It didn't hurt, I didn't mean to say that.  only sentence uttered after saying \"Ow\" when right LE moved  OBJECTIVE DATA SUMMARY:   HISTORY:   Past Medical History   Diagnosis Date    Chronic pain         hips and legs    Coronary artery disease involving native coronary artery of native heart with unstable angina pectoris ([facility] 11/14/2016    Diabetes ([facility]      GERD (gastroesophageal reflux disease)      Hypertension      On intra-aortic balloon pump assist 11/14/2016       Placed in cath lab; left leg    Other ill-defined conditions(799.89)         increased cholesterol    Other ill-defined conditions(799.89)         diverticulosis    Psychiatric disorder         anxiety    Stroke [facility])      Thyroid disease      Unspecified adverse effect of anesthesia         woke up early at end of bunionectomy     Past Surgical History   Procedure Laterality Date    Hx lap cholecystectomy        Hx urological           cytocele and rectocele repair    Hx heent           dental surgery - gum graft    Hx other surgical           colonoscopy x 2 - no polyps per pt    Hx orthopaedic           bilateral carpal tunnel    Hx orthopaedic           bilateral bunionectomy    Hx orthopaedic           partial hip replacement        Prior Level of Function/Home Situation: admit from [facility], CVA after cardiac cath 11/2016, prior to that was independent  Expanded or extensive additional review of patient history:   Patient admit from [facility], was in [facility] extended time and per chart [facility], multiple medical complications, rash entire body, PEG, harding  Home Situation  Home Environment: Private residence  # Steps to Enter: 3  One/Two Story Residence: One story  Living Alone: No  Support Systems: Child(per), Skilled nursing facility  Patient Expects to be Discharged to[de-identified] Rehabilitation facility  Current DME Used/Available at Home: louann Gonzalez  [X]  Right hand dominant             [ ]  Left hand dominant     EXAMINATION OF PERFORMANCE DEFICITS:  Cognitive/Behavioral Status:  Neurologic State: Confused;Drowsy (fluctuation in responsiveness)  Orientation Level: Oriented to person;Oriented to place;Oriented to situation;Oriented to time  Cognition: Decreased command following;Decreased attention/concentration  Perception: Cues to attend to left side of body;Cues to attend left visual field  Perseveration: Perseverates during conversation  Safety/Judgement: Decreased awareness of environment  Skin: rash entire body, present on admission per RN  Edema: left foot and ankle, educated patient's son on benefit of weight bearing, stretch and movement  Vision/Perceptual:       Not tested, kept eyes tightly shut throughout, opened 1 x when asked, noted at one point increased lateral eye movement and when lid physically opened eyes rolled back, informed RN, ?  If neuro following, ? If hx seizure activity                            Range of Motion:  AROM: Grossly decreased, non-functional (spontaneous > elicited)  PROM: Generally decreased, functional (resisting UE/LE movement/holding in flexion )                    Strength:  Strength: Generally decreased, functional              Coordination:  Coordination: Grossly decreased, non-functional  Fine Motor Skills-Upper: Left Impaired;Right Impaired    Gross Motor Skills-Upper: Left Impaired;Right Impaired  Tone & Sensation:  Tone: Abnormal (bilateral UE and LE, left > right, increased anxiety role)  Sensation:  (unable to test)         Balance:  Sitting: Impaired  Sitting - Static: Poor (constant support)  Sitting - Dynamic: Poor (constant support)  Standing:  (unable)     Functional Mobility and Transfers for ADLs:  Bed Mobility:  Rolling: Total assistance  Supine to Sit: Total assistance  Sit to Supine: Total assistance  Scooting: Total assistance     Transfers:  Sit to Stand: Other (comment) (unable)  Bed to Chair: Total assistance  Toilet Transfer : Total assistance     ADL Assessment:  Feeding: Total assistance     Oral Facial Hygiene/Grooming: Total assistance     Bathing: Total assistance     Upper Body Dressing: Total assistance     Lower Body Dressing: Total assistance     Toileting: Total assistance                 ADL Intervention and task modifications:     Patient's son present and educated on PROM along with relaxation techniques, positioning to increase comfort, decrease tension, prevent internal rotation of left LE. Instructed on performance of prolonged UE and LE stretches to perform with patient, handling techniques and joint alignment. Recommend positioning LE in bridge to weight bear through feet as she pushes into plantarflexion against bed and educated her son on result of loss of dorsiflexion.          Cognitive Retraining  Safety/Judgement: Decreased awareness of environment     Therapeutic Exercise: Instructed patient's son on performance of prolonged stretch with digits extended and/or wrist extended, dorsiflexion stretch and progressive increase in ROM, positioning of LE's in bridge to weight bear through feet, increase LE strength, decrease plantarflexion tightness   Functional Measure:  Barthel Index:      Bathin  Bladder: 0  Bowels: 0  Groomin  Dressin  Feedin  Mobility: 0  Stairs: 0  Toilet Use: 0  Transfer (Bed to Chair and Back): 0  Total: 0         Barthel and G-code impairment scale:  Percentage of impairment CH  0% CI  1-19% CJ  20-39% CK  40-59% CL  60-79% CM  80-99% CN  100%   Barthel Score 0-100 100 99-80 79-60 59-40 20-39 1-19    0   Barthel Score 0-20 20 17-19 13-16 9-12 5-8 1-4 0      The Barthel ADL Index: Guidelines  1. The index should be used as a record of what a patient does, not as a record of what a patient could do. 2. The main aim is to establish degree of independence from any help, physical or verbal, however minor and for whatever reason. 3. The need for supervision renders the patient not independent. 4. A patient's performance should be established using the best available evidence. Asking the patient, friends/relatives and nurses are the usual sources, but direct observation and common sense are also important. However direct testing is not needed. 5. Usually the patient's performance over the preceding 24-48 hours is important, but occasionally longer periods will be relevant. 6. Middle categories imply that the patient supplies over 50 per cent of the effort. 7. Use of aids to be independent is allowed. Erica Lua., Barthel, D.W. (6425). Functional evaluation: the Barthel Index. [address] [facility] (14)2. Ebonie Patel alicia ROMY Moreau, Dylan Landeros., Anabel Arboleda.Raymond, [address] (). Measuring the change indisability after inpatient rehabilitation; comparison of the responsiveness of the Barthel Index and Functional Sontag Measure.  Journal of Neurology, Neurosurgery, and Psychiatry, 66, 856-274. LILLIAM Chiang.HANS, ELEAZAR Ortiz, & Maria E Costa M.A. (2004.) Assessment of post-stroke quality of life in cost-effectiveness studies: The usefulness of the Barthel Index and the EuroQoL-5D. Quality of Life Research, 13, 953-71            G codes: In compliance with CMSs Claims Based Outcome Reporting, the following G-code set was chosen for this patient based on their primary functional limitation being treated: The outcome measure chosen to determine the severity of the functional limitation was the Barthel Index with a score of 0/100 which was correlated with the impairment scale. · Self Care:               - CURRENT STATUS:    CN - 100% impaired, limited or restricted               - GOAL STATUS:           CM - 80%-99% impaired, limited or restricted               - D/C STATUS:                       ---------------To be determined---------------      Pain:  Pain Scale 1: Numeric (0 - 10)  Pain Intensity 1: 0      on arrival holding face in whinced position, pushing feet into bed and hands fisted      did say \"Ow\" one time however stated \"I didn't mean to say that, it didn't actually hurt\" when right LE brought in flexion      Activity Tolerance:   Poor   Please refer to the flowsheet for vital signs taken during this treatment. After treatment:   [ ] Patient left in no apparent distress sitting up in chair  [X] Patient left in no apparent distress in bed  [X] Call bell left within reach  [X] Nursing notified  [X] Caregiver present  [ ] Bed alarm activated      COMMUNICATION/EDUCATION:   The patients plan of care was discussed with: Registered Nurse. [ ] Home safety education was provided and the patient/caregiver indicated understanding. [ ] Patient/family have participated as able in goal setting and plan of care. [ ] Patient/family agree to work toward stated goals and plan of care.   [ ] Patient understands intent and goals of therapy, but is neutral about his/her participation. [X] Patient is unable to participate in goal setting and plan of care. This patients plan of care is appropriate for delegation to [facility].      Thank you for this referral.  Bashir Ohara, OTR/L  Time Calculation: 37 mins

## 2017-01-15 NOTE — PROGRESS NOTES
Bedside shift change report given to Treva RN (oncoming nurse) by Noah Jung RN (offgoing nurse). Report included the following information SBAR, Kardex and MAR.

## 2017-01-15 NOTE — PROGRESS NOTES
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)      Overview: Emergency LIMA TO LAD; SVG TO OM; SVG to RCA UTILIZING RIGHT GREATER       SAPHENOUS VEIN      Systolic heart failure ([facility] (12/6/2016)      CVA (cerebral vascular accident) ([facility] [address] (12/6/2016)      HTN (hypertension) (1/13/2017)      Diabetes mellitus type 2, controlled ([facility] [address] (1/13/2017)      UTI (urinary tract infection) (1/13/2017)      Hyperkalemia (1/13/2017)      Lactic acid acidosis (1/13/2017)      PADMINI (acute kidney injury) ([facility] (1/13/2017)      VRE (vancomycin-resistant Enterococci) infection (1/15/2017)    ARF  · Rel stable chems now    Hypernatremia - no hx Li+++ - tap H20 via peg and D5w IV       Hyperkalemia, now hypokalemia  · Due to ARF, KCL, aldactone  · Better today  · Was hypokalemic with diuresis during recent admission to [facility]. KCL and aldactone started at that time.     CM/CHF  · Recent CABG  · EF 15%     DM  Insulin         Plan:   See above + thirst. No hx Li+++ per son.        Time spent with patient during dialysis no      Subjective:       Complaint:  thirst, lethargic      Current Facility-Administered Medications   Medication Dose Route Frequency    dextrose 5% with KCl 20 mEq/L infusion   IntraVENous CONTINUOUS    potassium chloride (KLOR-CON) packet 20 mEq  20 mEq Oral BID WITH MEALS    HYDROmorphone (DILAUDID) injection 0.5 mg  0.5 mg IntraVENous Q3H PRN    linezolid (ZYVOX) IVPB premix in D5W 600 mg  600 mg IntraVENous Q12H    aztreonam (AZACTAM) 1 g in 0.9% sodium chloride (MBP/ADV) 100 mL  1 g IntraVENous Q8H    metroNIDAZOLE (FLAGYL) IVPB premix 500 mg  500 mg IntraVENous Q6H    insulin glargine (LANTUS) injection 15 Units  15 Units SubCUTAneous DAILY    albuterol-ipratropium (DUO-NEB) 2.5 MG-0.5 MG/3 ML  3 mL Nebulization Q4H PRN    sodium chloride (NS) flush 5-10 mL  5-10 mL IntraVENous PRN    sodium chloride (NS) flush 5-10 mL 5-10 mL IntraVENous Q8H    sodium chloride (NS) flush 5-10 mL  5-10 mL IntraVENous PRN    acetaminophen (TYLENOL) tablet 650 mg  650 mg Oral Q4H PRN    ondansetron (ZOFRAN) injection 4 mg  4 mg IntraVENous Q4H PRN    heparin (porcine) injection 5,000 Units  5,000 Units SubCUTAneous Q8H    sodium chloride (NS) flush 5-10 mL  5-10 mL IntraVENous Q8H    sodium chloride (NS) flush 5-10 mL  5-10 mL IntraVENous PRN    aspirin delayed-release tablet 81 mg  81 mg Oral DAILY    escitalopram oxalate (LEXAPRO) tablet 20 mg  20 mg Oral DAILY    levothyroxine (SYNTHROID) tablet 50 mcg  50 mcg Oral ACB    methylphenidate (RITALIN) tablet 10 mg  10 mg Oral DAILY    midodrine (PROAMITINE) tablet 10 mg  10 mg Oral TID    therapeutic multivitamin (THERAGRAN) tablet 1 Tab  1 Tab Oral DAILY    nystatin (MYCOSTATIN) 100,000 unit/gram ointment   Topical TID    senna-docusate (PERICOLACE) 8.6-50 mg per tablet 1 Tab  1 Tab Oral BID    simvastatin (ZOCOR) tablet 20 mg  20 mg Oral QHS    oxyCODONE-acetaminophen (PERCOCET) 5-325 mg per tablet 1 Tab  1 Tab Oral Q4H PRN       Review of Systems  Pertinent items are noted in HPI.     Objective:     Visit Vitals    /53 (BP 1 Location: Left arm, BP Patient Position: At rest;Supine)    Pulse 99    Temp 98.7 °F (37.1 °C)    Resp 16    Ht 5' (1.524 m)    Wt 53.6 kg (118 lb 3.2 oz)    SpO2 100%    Breastfeeding No    BMI 23.08 kg/m2     Temp (24hrs), Av.2 °F (36.8 °C), Min:97.6 °F (36.4 °C), Max:98.7 °F (37.1 °C)      01/15 07 - 01/15 1900  In: 160 [I.V.:100]  Out: -     Physical Exam:    General [    ] healthy     [x] acutely ill [  x  ] chronically ill   [    ] critical      Skin  [x] Nl color/turgor [    ]   Eyes  [x] EOMI [    ]    ENT  [x] clear/moist [    ]     Neck  [x] supple  [    ]    Pulm  [x] clear to A/P [    ]    CV  [x] RRR  [    ]   ABD  [x] Soft  [    ]      [] Boles  [    ]    MS  [no LE] Edema  [    ]     Neuro             [x] lethargic but expressed desire for  A cup of water  [    ]    Psyche           [] Nl   [    ]       Data Review:     LABS:   Recent Results (from the past 24 hour(s))   GLUCOSE, POC    Collection Time: 01/14/17  4:38 PM   Result Value Ref Range    Glucose (POC) 103 (H) 65 - 100 mg/dL    Performed by Jose Lui (PCT)    GLUCOSE, POC    Collection Time: 01/14/17  9:06 PM   Result Value Ref Range    Glucose (POC) 98 65 - 100 mg/dL    Performed by Malik Escoto (PCT)    CBC W/O DIFF    Collection Time: 01/15/17  4:23 AM   Result Value Ref Range    WBC 18.9 (H) 3.6 - 11.0 K/uL    RBC 4.16 3.80 - 5.20 M/uL    HGB 11.0 (L) 11.5 - 16.0 g/dL    HCT 37.0 35.0 - 47.0 %    MCV 88.9 80.0 - 99.0 FL    MCH 26.4 26.0 - 34.0 PG    MCHC 29.7 (L) 30.0 - 36.5 g/dL    RDW 17.8 (H) 11.5 - 14.5 %    PLATELET 731 (H) 154 - 659 K/uL   METABOLIC PANEL, BASIC    Collection Time: 01/15/17  4:23 AM   Result Value Ref Range    Sodium 152 (H) 136 - 145 mmol/L    Potassium 2.9 (L) 3.5 - 5.1 mmol/L    Chloride 112 (H) 97 - 108 mmol/L    CO2 28 21 - 32 mmol/L    Anion gap 12 5 - 15 mmol/L    Glucose 92 65 - 100 mg/dL    BUN 62 (H) 6 - 20 MG/DL    Creatinine 1.30 (H) 0.55 - 1.02 MG/DL    BUN/Creatinine ratio 48 (H) 12 - 20      GFR est AA 48 (L) >60 ml/min/1.73m2    GFR est non-AA 40 (L) >60 ml/min/1.73m2    Calcium 8.8 8.5 - 10.1 MG/DL   HEPATIC FUNCTION PANEL    Collection Time: 01/15/17  4:23 AM   Result Value Ref Range    Protein, total 6.1 (L) 6.4 - 8.2 g/dL    Albumin 2.5 (L) 3.5 - 5.0 g/dL    Globulin 3.6 2.0 - 4.0 g/dL    A-G Ratio 0.7 (L) 1.1 - 2.2      Bilirubin, total 0.4 0.2 - 1.0 MG/DL    Bilirubin, direct 0.1 0.0 - 0.2 MG/DL    Alk.  phosphatase 102 45 - 117 U/L    AST 29 15 - 37 U/L    ALT 33 12 - 78 U/L   PHOSPHORUS    Collection Time: 01/15/17  4:23 AM   Result Value Ref Range    Phosphorus 4.2 2.6 - 4.7 MG/DL   MAGNESIUM    Collection Time: 01/15/17  4:23 AM   Result Value Ref Range    Magnesium 2.2 1.6 - 2.4 mg/dL   LACTIC ACID, PLASMA Collection Time: 01/15/17  4:25 AM   Result Value Ref Range    Lactic acid 2.3 (HH) 0.4 - 2.0 MMOL/L   GLUCOSE, POC    Collection Time: 01/15/17  8:07 AM   Result Value Ref Range    Glucose (POC) 106 (H) 65 - 100 mg/dL    Performed by Marina Romero    GLUCOSE, POC    Collection Time: 01/15/17 11:21 AM   Result Value Ref Range    Glucose (POC) 99 65 - 100 mg/dL    Performed by King Mckenzie (PCT)                  Signed By: Sam Maya MD, ISAAC                      January 15, 2017                      [email or web address]

## 2017-01-16 NOTE — PROGRESS NOTES
Spoke to patient's daughter, Ryan Garcia, at bedside. She has decided on Comfort Measures only. Will discontinue all IVF, medications. Start IV dilaudid prn for pain and IV ativan prn anxiety. Cont O2, nebs prn. Hospice already consulted. Patient has a poor prognosis and will likely  during this hospitalization.   Spent ~35min on prolonged care

## 2017-01-16 NOTE — HOSPICE
[facility]  [phone]    Nursing Note   Patient Name: Valeria Jalloh  YOB: 1940  Age: 67 y.o. [facility] RN Note:  Hospice consult noted, Chart reviewed, Plan of care reviewed with patients nurse & Care manager. In to meet with son Sher [place] and daughter Alejandro Cates. Discussed Hospice philosophy, general plan of care, levels of care, services and on call procedures. Family information packet provided & reviewed with children. Decision to proceed with hospice care. Patient unstable for transfer home at this time. Having frequent periods of apnea lasting up to 40 seconds since early afternoon, hypotensive. Eyes remain open but unresponsive and does not track. Emotional support provided to son and daughter who plan to remain at bedside at this time. If patient stabilizes plan will be for patient to discharge home to [facility] with assistance of sister for care at [facility]. Consents signed.  Discharge order received from Dr. Bj Sapp. Admission orders received by Trinidad Guerrero NP.

## 2017-01-16 NOTE — PROGRESS NOTES
NUTRITION    MD Consult     Consult received for management of Nutrition Support (PEG) from physician. Pt is being admitted to Hospice care. Nutrition support is being withdrawn as per hospice. No needs. Please re-consult if needs arise.      Ventura Sampson RD, MS, CDE

## 2017-01-16 NOTE — PROGRESS NOTES
Noted pending hospice consult. Patient has PEG For nutrition and can use if MD/RD chose. Will f/u tomorrow if appropriate.

## 2017-01-16 NOTE — HOSPICE
Hospice consult received. Discussed with palliative NP and unit RN. Met briefly with daughter Lulu Plummer and provided general Information on hospice. Daughter needing to discuss decisions and discharge disposition with her brother. She feels patient may be able to discharge home to her brothers home. Tearful at times, emotional support and active listening provided. Validation of fears and decisions she has made to this point in assuring patient comfort. Plan for Lulu Plummer to contact her brother to see if he will be available to meet this afternoon or tomorrow. Number provided to contact [email or web address] with any questions and when her brother will be available. If he is unable to come to the hospital will discuss over phone tomorrow. Provided hospice folder, GFMS and [employer] ([facility] nutrition information).

## 2017-01-16 NOTE — PROGRESS NOTES
Progress Note                                        Avery Pineda MD, [facility] [address], [place], [address] [place] [address] [place]                                                 Phone [phone]; Fax [phone]        2017      Admit Date:           2017  Admit Diagnosis:  Sepsis ([address]  :          1940   MRN:          [id number]   ASSESSMENT/RECOMMENDATION:   1)CAD/S/P CABG complicated by CVA and one admission after bypass for CHF   -hemodynamics preclude BB resumption      2) Ischemic CM with EF by cath of 30%  -hemodynamics preclude BB, ACE-I.    -ECHO here EF 25%   - not volume overloaded on exam, watch for volume overload  -DNR therefore presume no AICD: family considering Hospice      3) Hypokalemia:   -replete K via IV       ATTENDING CARDIOLOGIST    PATIENT was  Personally examined and chart reviewed. All the elements of history and examination were personally performed and I agree with the plan as listed above. Treatment plan was addressed with the patient. Lizette Hahn MD                  SUBJECTIVE           Crissy Luong is a 67 y.o. female I am seeing for diabetes, hypertension, hyperlipidemia, coronary artery disease and status post CABG. Symptoms include: confusion Cardiac risk factors: dyslipidemia, diabetes mellitus, sedentary life style, hypertension, post-menopausal. Ms. Elena Gonzales has had a rough course after her CABG. She had ner CABG in nov. And then had a CVA and was readmitted to [facility]. She was admitted to [facility] and developed confusion and was transferred to [facility]. Her Postassium was elevated. She was discharged a couple of days ago after admission for CHF. ECHO: Left ventricle: Apical hypokinesis. Ejection fraction was estimated to be 25 %. Wall thickness was at the upper limits of normal.    Mitral valve:  There was mild regurgitation. Aortic valve: The valve was trileaflet. Leaflets exhibited sclerosis. Tricuspid valve: There was mild regurgitation.       Crissy CHERYL Azul : non verbal, grimaces to stimulation      Current Facility-Administered Medications   Medication Dose Route Frequency    dextrose 5% with KCl 20 mEq/L infusion   IntraVENous CONTINUOUS    HYDROmorphone (DILAUDID) injection 0.5 mg  0.5 mg IntraVENous Q3H PRN    linezolid (ZYVOX) IVPB premix in D5W 600 mg  600 mg IntraVENous Q12H    aztreonam (AZACTAM) 1 g in 0.9% sodium chloride (MBP/ADV) 100 mL  1 g IntraVENous Q8H    metroNIDAZOLE (FLAGYL) IVPB premix 500 mg  500 mg IntraVENous Q6H    insulin glargine (LANTUS) injection 15 Units  15 Units SubCUTAneous DAILY    albuterol-ipratropium (DUO-NEB) 2.5 MG-0.5 MG/3 ML  3 mL Nebulization Q4H PRN    sodium chloride (NS) flush 5-10 mL  5-10 mL IntraVENous PRN    sodium chloride (NS) flush 5-10 mL  5-10 mL IntraVENous Q8H    sodium chloride (NS) flush 5-10 mL  5-10 mL IntraVENous PRN    acetaminophen (TYLENOL) tablet 650 mg  650 mg Oral Q4H PRN    ondansetron (ZOFRAN) injection 4 mg  4 mg IntraVENous Q4H PRN    heparin (porcine) injection 5,000 Units  5,000 Units SubCUTAneous Q8H    sodium chloride (NS) flush 5-10 mL  5-10 mL IntraVENous Q8H    sodium chloride (NS) flush 5-10 mL  5-10 mL IntraVENous PRN    aspirin delayed-release tablet 81 mg  81 mg Oral DAILY    escitalopram oxalate (LEXAPRO) tablet 20 mg  20 mg Oral DAILY    levothyroxine (SYNTHROID) tablet 50 mcg  50 mcg Oral ACB    methylphenidate (RITALIN) tablet 10 mg  10 mg Oral DAILY    midodrine (PROAMITINE) tablet 10 mg  10 mg Oral TID    therapeutic multivitamin (THERAGRAN) tablet 1 Tab  1 Tab Oral DAILY    nystatin (MYCOSTATIN) 100,000 unit/gram ointment   Topical TID    senna-docusate (PERICOLACE) 8.6-50 mg per tablet 1 Tab  1 Tab Oral BID    simvastatin (ZOCOR) tablet 20 mg  20 mg Oral QHS    oxyCODONE-acetaminophen (PERCOCET) 5-325 mg per tablet 1 Tab  1 Tab Oral Q4H PRN      OBJECTIVE               Intake/Output Summary (Last 24 hours) at 01/16/17 0820  Last data filed at 01/16/17 0419   Gross per 24 hour   Intake              800 ml   Output              501 ml   Net              299 ml       Review of Systems - non verbal  General ROS: negative  Psychological ROS: negative  Ophthalmic ROS: negative  Allergy and Immunology ROS: negative  Hematological and Lymphatic ROS: negative  Endocrine ROS: negative  Respiratory ROS: negative  Cardiovascular ROS: negative  Gastrointestinal ROS: negative  Musculoskeletal ROS: negative  Neurological ROS: negative  Dermatological ROS: negative      PHYSICAL EXAM        Visit Vitals    /55 (BP 1 Location: Left arm, BP Patient Position: At rest)    Pulse 90    Temp 98 °F (36.7 °C)    Resp 11    Ht 5' (1.524 m)    Wt 126 lb 12.8 oz (57.5 kg)    SpO2 98%    Breastfeeding No    BMI 24.76 kg/m2       Gen: Keeps eyes closed during exam, non verbal. Generalized red rash on upper and lower ext and torso. HEENT:  moist mucous membranes. Neck: Supple, No JVD, No Carotid Bruit, Thyroid- non tender   Resp: No accessory muscle use, Clear breath sounds, No rales or rhonchi  Card: Regular Rate,Rythm,Normal S1, S2, No murmurs, rubs or gallop. No thrills. MSK: No cyanosis or clubbing. Skin: No rashes or ulcers, no bruising  Neuro:  Cranial nerves are grossly intact, moving all four extremities, no focal deficit, follows commands appropriately  Psych:  Non verbal ? insight  LE: L hand edema  : harding   Vascular: Distal Pulses 2+ and symmetric      DATA REVIEW            Laboratory and Imaging have been reviewed by me and are notable for  No results for input(s): CPK, CKMB, TROIQ in the last 72 hours.   Recent Labs      01/16/17   0358  01/15/17   0423  01/14/17   1100   NA  139  152*  148*   K  3.2*  2.9*  2.6*   CO2  22  28  28   BUN  51*  62*  68*   CREA  1.21*  1.30*  1.16*   GLU  90  92  85 PHOS  3.5  4.2  4.8*   MG  1.9  2.2  2.2   WBC  18.8*  18.9*  14.0*   HGB  10.4*  11.0*  9.8*   HCT  35.7  37.0  33.0*   PLT  622*  718*  676*             Oscar Bailey NP

## 2017-01-16 NOTE — PROGRESS NOTES
Problem: Mobility Impaired (Adult and Pediatric)  Goal: *Acute Goals and Plan of Care (Insert Text)  Physical Therapy Goals  Initiated 1/14/2017  1. Patient will move from supine to sit and sit to supine and roll side to side in bed with moderate assistance x 2 within 7 day(s). 2. Patient will sustain sitting balance for one minute edge of bed with SBA x 2 within 7 days. 3. Patient will tolerate sitting edge of bed x 10 minutes prop sitting within 7 days. 4. Patient will demonstrate sit<>stand with moderate assistance x 2 facilitating use of LUE and LLE - hip and knee extension with left foot flat WBAT within 7 days. 5. Patient will tolerate AAROM and AROM exercises x 4 extremities in supine with moderate assist and cues within 7 days. PHYSICAL THERAPY TREATMENT  Patient: Olimpia Alas (49 y.o. female)  Date: 1/16/2017  Diagnosis: Sepsis ([facility] [address] Sepsis ([facility] [address] [address]       Precautions: Fall, Skin, Aspiration  Chart, physical therapy assessment, plan of care and goals were reviewed. ASSESSMENT:  Pt received AAROM /PROM  to Lucie Coronado. Pt tolerated treatment fairly well. Continue goals. Progression toward goals:  [ ]      Improving appropriately and progressing toward goals  [ ]      Improving slowly and progressing toward goals  [ ]      Not making progress toward goals and plan of care will be adjusted       PLAN:  Patient continues to benefit from skilled intervention to address the above impairments. Continue treatment per established plan of care.   Discharge Recommendations:  Patel Oleary and To Be Determined  Further Equipment Recommendations for Discharge:         SUBJECTIVE:          OBJECTIVE DATA SUMMARY:   Critical Behavior:  Neurologic State: Drowsy  Orientation Level: Unable to verbalize  Cognition: Decreased attention/concentration  Safety/Judgement: Decreased awareness of environment           Therapeutic Exercises:   Pt performed LE AAROM /PROM   Pain:  Pain Scale 1: Adult Nonverbal Pain Scale  Pain Intensity 1: 2              Activity Tolerance:   Pt tolerated treatment fairly well. Please refer to the flowsheet for vital signs taken during this treatment.   After treatment:   [ ] Patient left in no apparent distress sitting up in chair  [X] Patient left in no apparent distress in bed  [ ] Call bell left within reach  [ ] Nursing notified  [ ] Caregiver present  [ ] Bed alarm activated      COMMUNICATION/COLLABORATION:   The patients plan of care was discussed with: Physical Therapist     Lary Bautista PTA   Time Calculation: 20 mins

## 2017-01-16 NOTE — PROGRESS NOTES
Consult noted for hospice, pt's daughter met with Palliative Care. I have sent consult to [facility] in [facility]. I will continue to follow.  Thanks MARIANO Reed

## 2017-01-16 NOTE — PROGRESS NOTES
[address] [facility]  YOB: 1940          Assessment & Plan:   ARF  · Likely pre-renal related to cardiac disease, diuresis, intake  · Renal function better    Hyperkalemia  · Due to ARF, KCL, aldactone  · Now hypokalemic  · Replete today. CM/CHF  · Recent CABG  · EF 15%    DM  · Insulin       Subjective:   CC: Hyperkalemia, ARF  HPI: Pt doing poorly overall. Renal function improved. Potassium now low.    ROS: Very weak with limited verbal response  Current Facility-Administered Medications   Medication Dose Route Frequency    potassium chloride (KLOR-CON) packet 20 mEq  20 mEq Oral NOW    dextrose 5% with KCl 20 mEq/L infusion   IntraVENous CONTINUOUS    HYDROmorphone (DILAUDID) injection 0.5 mg  0.5 mg IntraVENous Q3H PRN    linezolid (ZYVOX) IVPB premix in D5W 600 mg  600 mg IntraVENous Q12H    aztreonam (AZACTAM) 1 g in 0.9% sodium chloride (MBP/ADV) 100 mL  1 g IntraVENous Q8H    metroNIDAZOLE (FLAGYL) IVPB premix 500 mg  500 mg IntraVENous Q6H    insulin glargine (LANTUS) injection 15 Units  15 Units SubCUTAneous DAILY    albuterol-ipratropium (DUO-NEB) 2.5 MG-0.5 MG/3 ML  3 mL Nebulization Q4H PRN    sodium chloride (NS) flush 5-10 mL  5-10 mL IntraVENous PRN    sodium chloride (NS) flush 5-10 mL  5-10 mL IntraVENous Q8H    sodium chloride (NS) flush 5-10 mL  5-10 mL IntraVENous PRN    acetaminophen (TYLENOL) tablet 650 mg  650 mg Oral Q4H PRN    ondansetron (ZOFRAN) injection 4 mg  4 mg IntraVENous Q4H PRN    heparin (porcine) injection 5,000 Units  5,000 Units SubCUTAneous Q8H    sodium chloride (NS) flush 5-10 mL  5-10 mL IntraVENous Q8H    sodium chloride (NS) flush 5-10 mL  5-10 mL IntraVENous PRN    aspirin delayed-release tablet 81 mg  81 mg Oral DAILY    escitalopram oxalate (LEXAPRO) tablet 20 mg  20 mg Oral DAILY    levothyroxine (SYNTHROID) tablet 50 mcg  50 mcg Oral ACB    methylphenidate (RITALIN) tablet 10 mg  10 mg Oral DAILY    midodrine (PROAMITINE) tablet 10 mg  10 mg Oral TID    therapeutic multivitamin (THERAGRAN) tablet 1 Tab  1 Tab Oral DAILY    nystatin (MYCOSTATIN) 100,000 unit/gram ointment   Topical TID    senna-docusate (PERICOLACE) 8.6-50 mg per tablet 1 Tab  1 Tab Oral BID    simvastatin (ZOCOR) tablet 20 mg  20 mg Oral QHS    oxyCODONE-acetaminophen (PERCOCET) 5-325 mg per tablet 1 Tab  1 Tab Oral Q4H PRN          Objective:     Vitals:  Blood pressure 97/64, pulse 89, temperature 98 °F (36.7 °C), resp. rate 12, height 5' (1.524 m), weight 57.5 kg (126 lb 12.8 oz), SpO2 95 %, not currently breastfeeding. Temp (24hrs), Av.2 °F (36.8 °C), Min:98 °F (36.7 °C), Max:98.5 °F (36.9 °C)      Intake and Output:   07 -  1900  In: 250   Out: -    190 -  0700  In: 2337.5 [I.V.:1407.5]  Out: 1201 [Urine:1200]    Physical Exam:               GENERAL ASSESSMENT: weak, ill appearing  HEENT:Nontraumatic   CHEST: CTA  HEART: S1S2  ABDOMEN: Soft,NT  : +Boles  EXTREMITY: no EDEMA          ECG/rhythm:    Data Review      No results for input(s): TNIPOC in the last 72 hours. No lab exists for component: ITNL   No results for input(s): CPK, CKMB, TROIQ in the last 72 hours. Recent Labs      17   0358  01/15/17   0423  17   1100   17   1214   NA  139  152*  148*   --   137   K  3.2*  2.9*  2.6*   < >  6.0*   CL  106  112*  111*   --   100   CO2  22  28  28   --   26   BUN  51*  62*  68*   --   93*   CREA  1.21*  1.30*  1.16*   --   1.35*   GLU  90  92  85   --   87   PHOS  3.5  4.2  4.8*   --    --    MG  1.9  2.2  2.2   --    --    CA  8.0*  8.8  8.6   --   9.6   ALB   --   2.5*   --    --   3.4*   WBC  18.8*  18.9*  14.0*   --   17.0*   HGB  10.4*  11.0*  9.8*   --   12.3   HCT  35.7  37.0  33.0*   --   40.8   PLT  622*  718*  676*   --   805*    < > = values in this interval not displayed.       No results for input(s): INR, PTP, APTT in the last 72 hours.    No lab exists for component: INREXT, INREXT  Needs: urine analysis, urine sodium, protein and creatinine  No results found for: YANNI CASANOVA        : Christophe Sanders MD  1/16/2017        [facility]:  [email or web address]  [place] office:  [address] [place] [address], [address] [place] [address],[address]  [place], [address] [place] [address]  Phone: [phone]  Fax :     [phone]    [place] office:  [address] [facility], [facility] [address] [facility]  Phone - [phone]  Fax - [phone]

## 2017-01-16 NOTE — PROGRESS NOTES
1328: Pt in pain. Daughter at bedside. Pt keeps stating Rich Pradhan it hurts so bad. \" However, pt hypotensive. Attempted to contact MD Ahmadi about the previous. Patient Vitals for the past 4 hrs:   Temp Pulse Resp BP SpO2   01/16/17 1314 97.6 °F (36.4 °C) 86 20 (!) 77/45 100 %       1331: Notified MD Ahmadi of the above. MD stated he will be up to take a look at pt. Will continue to monitor. 1400: MD Do in for rounds & pt daughter agreed to comfort care. Verbal order received from MD to give pt 1 mg IV Dilaudid, stop IVF & ATBs. 1529: Pt having long periods of apnea after receiving pain medication. RN Bhumi Pelaez w/ hospice recommended to lower pain medication dose. Texted MD Ahmadi about the previous. Received order to lower Dilaudid dose to 0.5 mg IV.

## 2017-01-16 NOTE — PROGRESS NOTES
Patient's daughter called to let Dr. Musa Tai know that she thinks they are ready to discuss Hospice care. I told her that a consult was put in this morning for Palliative care. Patient's daughter said she would be in around noon today. I left a message with Malika Stevenson that family plans to be here around noon today.

## 2017-01-16 NOTE — WOUND CARE
Wound care consult:  Initial visit for skin assessment, alert and oriented, no distress. Assessment  All skin folds and bony prominences assessed, turned with staff assistance. Incontinent of stool, light red raised rash over entire trunk and lower legs related to drug rash. Heels and elbows intact, no redness. Inner groins are pink with yeast like rash. Treatment  Incontinent care given, moisture barrier applied. Repositioned in bed   Heels floated; prevalon boots applied. Recommendations/Plan  Turn, reposition every 2 hours as tolerated, float heels, place in prevalon boots. Incontinent care with comfort shields. Apply moisture barrier as needed. Nystatin to groins. Will follow, reconsult as needed.     Randolph Polanco

## 2017-01-16 NOTE — PROGRESS NOTES
Corwin Galindo [facility]  [address] [facility], [place], [address] [place] [address]  [phone]      Medical Progress Note      NAME: Wilfrido Gaspar   :  1940  MRM:  [id number]    Date/Time: 2017         Subjective:     Chief Complaint:  Patient was seen and examined by me. Chart reviewed. Continued to have abd pain, remains confused       Objective:       Vitals:       Last 24hrs VS reviewed since prior progress note. Most recent are:    Visit Vitals    BP 97/64 (BP 1 Location: Left arm, BP Patient Position: At rest)    Pulse 89    Temp 98 °F (36.7 °C)    Resp 12    Ht 5' (1.524 m)    Wt 57.5 kg (126 lb 12.8 oz)    SpO2 95%    Breastfeeding No    BMI 24.76 kg/m2     SpO2 Readings from Last 6 Encounters:   17 95%   17 100%   16 98%   14 99%   11 98%    O2 Flow Rate (L/min): 2 l/min       Intake/Output Summary (Last 24 hours) at 17 0910  Last data filed at 17 0826   Gross per 24 hour   Intake             1050 ml   Output              501 ml   Net              549 ml        Exam:     Physical Exam:    Gen:  Disheveled, chronically ill appearing, frail, mild distress  HEENT:  Pink conjunctivae, PERRL, hearing intact to voice, moist mucous membranes  Neck:  Supple, without masses, thyroid non-tender  Resp:  No accessory muscle use, some rhonchi at bases  Card:  No murmurs, normal S1, S2 without thrills, trace edema. Chest scar c/d/i  Abd:  Soft, diffused abd pain, non-distended, PEG c/d/i  Musc:  No cyanosis or clubbing  Skin:  Diffused macular papular rash (unchanged)  Neuro:  Cranial nerves 3-12 are grossly intact, chronic L hemiplegia  Psych:  poor insight.   Lethargic but arousable, very confused    Medications Reviewed: (see below)    Lab Data Reviewed: (see below)    ______________________________________________________________________    Medications:     Current Facility-Administered Medications   Medication Dose Route Frequency    dextrose 5% with KCl 20 mEq/L infusion   IntraVENous CONTINUOUS    HYDROmorphone (DILAUDID) injection 0.5 mg  0.5 mg IntraVENous Q3H PRN    linezolid (ZYVOX) IVPB premix in D5W 600 mg  600 mg IntraVENous Q12H    aztreonam (AZACTAM) 1 g in 0.9% sodium chloride (MBP/ADV) 100 mL  1 g IntraVENous Q8H    metroNIDAZOLE (FLAGYL) IVPB premix 500 mg  500 mg IntraVENous Q6H    insulin glargine (LANTUS) injection 15 Units  15 Units SubCUTAneous DAILY    albuterol-ipratropium (DUO-NEB) 2.5 MG-0.5 MG/3 ML  3 mL Nebulization Q4H PRN    sodium chloride (NS) flush 5-10 mL  5-10 mL IntraVENous PRN    sodium chloride (NS) flush 5-10 mL  5-10 mL IntraVENous Q8H    sodium chloride (NS) flush 5-10 mL  5-10 mL IntraVENous PRN    acetaminophen (TYLENOL) tablet 650 mg  650 mg Oral Q4H PRN    ondansetron (ZOFRAN) injection 4 mg  4 mg IntraVENous Q4H PRN    heparin (porcine) injection 5,000 Units  5,000 Units SubCUTAneous Q8H    sodium chloride (NS) flush 5-10 mL  5-10 mL IntraVENous Q8H    sodium chloride (NS) flush 5-10 mL  5-10 mL IntraVENous PRN    aspirin delayed-release tablet 81 mg  81 mg Oral DAILY    escitalopram oxalate (LEXAPRO) tablet 20 mg  20 mg Oral DAILY    levothyroxine (SYNTHROID) tablet 50 mcg  50 mcg Oral ACB    methylphenidate (RITALIN) tablet 10 mg  10 mg Oral DAILY    midodrine (PROAMITINE) tablet 10 mg  10 mg Oral TID    therapeutic multivitamin (THERAGRAN) tablet 1 Tab  1 Tab Oral DAILY    nystatin (MYCOSTATIN) 100,000 unit/gram ointment   Topical TID    senna-docusate (PERICOLACE) 8.6-50 mg per tablet 1 Tab  1 Tab Oral BID    simvastatin (ZOCOR) tablet 20 mg  20 mg Oral QHS    oxyCODONE-acetaminophen (PERCOCET) 5-325 mg per tablet 1 Tab  1 Tab Oral Q4H PRN          Lab Review:     Recent Labs      01/16/17   0358  01/15/17   0423  01/14/17   1100   WBC  18.8*  18.9*  14.0*   HGB  10.4*  11.0*  9.8*   HCT  35.7  37.0  33.0*   PLT  622*  718*  676*     Recent Labs      01/16/17   0359 01/15/17   0423  01/14/17   1100   01/13/17   1214   NA  139  152*  148*   --   137   K  3.2*  2.9*  2.6*   < >  6.0*   CL  106  112*  111*   --   100   CO2  22  28  28   --   26   GLU  90  92  85   --   87   BUN  51*  62*  68*   --   93*   CREA  1.21*  1.30*  1.16*   --   1.35*   CA  8.0*  8.8  8.6   --   9.6   MG  1.9  2.2  2.2   --    --    PHOS  3.5  4.2  4.8*   --    --    ALB   --   2.5*   --    --   3.4*   TBILI   --   0.4   --    --   0.5   SGOT   --   29   --    --   42*   ALT   --   33   --    --   54    < > = values in this interval not displayed. Lab Results   Component Value Date/Time    Glucose (POC) 94 01/16/2017 08:06 AM    Glucose (POC) 108 01/15/2017 08:55 PM    Glucose (POC) 99 01/15/2017 11:21 AM    Glucose (POC) 106 01/15/2017 08:07 AM    Glucose (POC) 98 01/14/2017 09:06 PM          Assessment / Plan:     Principal Problem:    66 yo hx of HTN, DM, recent CAD s/p CABG, ICM EF 15%, CVA w/ L hemiplegia, presented w/ AMS, sepsis, PADMINI, hyperkalemia    1) Acute encephalopathy: likely due to sepsis. Not much improvement. Poor prognosis. Head CT neg. Already spoke to daughter, Heather Vincent. They will speak with palliative care today and consider comfort care, hospice    2) Sepsis/possible UTI: sepsis POA, still present. Patient with hx of frequent UTI. Prelim UCx cultures with VRE. Given drug rash, IV CTX was d/c. Will cont IV abx to Linezolid/aztreonam/flagyl. ID to see    3) Abd pain:  Unclear etiology. Abd CT was unremarkable. Cont IV pain meds    4) Lactic acidosis: due to sepsis, dehydration. Slowly improving with IVF    5) PADMINI: likely from diuresis, pre-renal.  Slowly improving. Renal is following. Holding diuretics, aldactone. Cont IVF, Monitor BMP    6) Hypernatremia: due to poor intake, diuretics. Resolved with D5W, monitor    7) Hyperkalemia: due to PADMINI, aldactone. Now resolved.   Cont to hold aldactone, monitor    8) CAD/chronic syst CHF: prior EF was 15%, but repeat echo with EF 25%. Holding bumex, aldactone, metolazone. Cards following. Given poor functional status, family to consider comfort care, hospice    9) CVA: hx of a large R frontal CVA w/ associated L hemiplegia. Cont ASA, statin    10) Hypotension: cont midodrine    11) DM type 2 w/ renal complications:   Issues with hypoglycemia. NPH was d/c. Cont Lantus, SSI    12) Candidiasis: cont nystatin    13) Drug Rash: POA. Will d/c IV CTX.   Cont benadryl prn, monitor closely    14) Severe protein calorie malnutrition: will consult nutrition regarding tube feed    Code: DNR     Total time spent with patient: 40 Minutes (I spent >50% of time on care coordination)                 Care Plan discussed with: Family, patient, nursing    Discussed:  Care Plan    Prophylaxis:  Hep SQ    Disposition:  [facility]/Rehab           ___________________________________________________    Attending Physician: Lizzeth Resendez MD

## 2017-01-16 NOTE — PROGRESS NOTES
Pt presents with Sepsis ([facility].)   Days in  LOS at this time is 3    Problem List  Date Reviewed: 1/13/2017          Codes Class Noted    VRE (vancomycin-resistant Enterococci) infection ICD-10-CM: A49.1, Z16.21  ICD-9-CM: 041.04, V09.80  1/15/2017        * (Principal)Sepsis ([place] [address] ICD-10-CM: A41.9  ICD-9-CM: 038.9, 995.91  1/13/2017        HTN (hypertension) ICD-10-CM: I10  ICD-9-CM: 401.9  1/13/2017        Diabetes mellitus type 2, controlled ([place] [address].) ICD-10-CM: E11.9  ICD-9-CM: 250.00  1/13/2017        UTI (urinary tract infection) ICD-10-CM: N39.0  ICD-9-CM: 599.0  1/13/2017        Hyperkalemia ICD-10-CM: E87.5  ICD-9-CM: 276.7  1/13/2017        Lactic acid acidosis ICD-10-CM: E87.2  ICD-9-CM: 276.2  1/13/2017        PADMINI (acute kidney injury) ([place] [address].) ICD-10-CM: N17.9  ICD-9-CM: 584.9  6/47/0669        Systolic heart failure ([place] [address].) ICD-10-CM: I50.20  ICD-9-CM: 428.20  12/6/2016        CVA (cerebral vascular accident) [facility]) ICD-10-CM: I63.9  ICD-9-CM: 434.91  12/6/2016        Acute CVA (cerebrovascular accident) [facility]) ICD-10-CM: I63.9  ICD-9-CM: 434.91  11/18/2016        S/P CABG x 3 ICD-10-CM: Z95.1  ICD-9-CM: V45.81  11/14/2016    Overview Signed 11/14/2016  9:10 PM by GREG Warren     Emergency LIMA TO LAD; SVG TO OM; SVG to RCA UTILIZING RIGHT GREATER SAPHENOUS VEIN             Coronary artery disease involving native coronary artery of native heart with unstable angina pectoris ([facility] ICD-10-CM: I25.110  ICD-9-CM: 414.01, 411.1  11/14/2016        On intra-aortic balloon pump assist ICD-10-CM: Z98.890  ICD-9-CM: V45.89  11/14/2016    Overview Signed 11/14/2016  9:11 PM by GREG Warren     Placed in cath lab; left leg             Acute CHF (congestive heart failure) ([facility] ICD-10-CM: I50.9  ICD-9-CM: 428.0  11/10/2016              Principal Problem:    Sepsis ([facility] (1/13/2017)    Active Problems:    S/P CABG x 3 (11/14/2016)      Overview: Emergency LIMA TO LAD; SVG TO OM; SVG to RCA UTILIZING RIGHT GREATER SAPHENOUS VEIN      Systolic heart failure ([facility] (12/6/2016)      CVA (cerebral vascular accident) ([facility] (12/6/2016)      HTN (hypertension) (1/13/2017)      Diabetes mellitus type 2, controlled ([facility] (1/13/2017)      UTI (urinary tract infection) (1/13/2017)      Hyperkalemia (1/13/2017)      Lactic acid acidosis (1/13/2017)      PADMINI (acute kidney injury) ([facility] (1/13/2017)      VRE (vancomycin-resistant Enterococci) infection (1/15/2017)        Pt cardiac rhythm at this time is NSR. Last documented Troponin No results for input(s): TROIQ in the last 72 hours. Pt last three weights    Last 3 Recorded Weights in this Encounter    01/14/17 0532 01/15/17 0522 01/16/17 0421   Weight: 51.4 kg (113 lb 6.4 oz) 53.6 kg (118 lb 3.2 oz) 57.5 kg (126 lb 12.8 oz)    last weighed via  57.5Kg  gained, 8 lbs. .     Pt has the following consults Consult Physical Therapy for conditioning and discharge planning, Consult Case management for discharge planning, Consult PCP for medical management, Consult Cardiology for cardiac management and Consult Infectious Disease for antibiotic management    Pt is currently taking   Current Facility-Administered Medications   Medication Dose Route Frequency    LORazepam (ATIVAN) injection 1 mg  1 mg IntraVENous Q4H PRN    HYDROmorphone (DILAUDID) injection 0.2 mg  0.2 mg IntraVENous Q3H PRN    HYDROmorphone (PF) (DILAUDID) injection 0.5 mg  0.5 mg IntraVENous Q3H PRN    albuterol-ipratropium (DUO-NEB) 2.5 MG-0.5 MG/3 ML  3 mL Nebulization Q4H PRN    sodium chloride (NS) flush 5-10 mL  5-10 mL IntraVENous PRN    sodium chloride (NS) flush 5-10 mL  5-10 mL IntraVENous Q8H    sodium chloride (NS) flush 5-10 mL  5-10 mL IntraVENous PRN    acetaminophen (TYLENOL) tablet 650 mg  650 mg Oral Q4H PRN    ondansetron (ZOFRAN) injection 4 mg  4 mg IntraVENous Q4H PRN    sodium chloride (NS) flush 5-10 mL  5-10 mL IntraVENous Q8H    sodium chloride (NS) flush 5-10 mL  5-10 mL IntraVENous PRN    nystatin (MYCOSTATIN) 100,000 unit/gram ointment   Topical TID       Meds held in the past 12 HOURS hours are:    Due to: discontinue: Antibotics    UPDATE INTAKE AND OUTPUT    Intake/Output Summary (Last 24 hours) at 01/16/17 1537  Last data filed at 01/16/17 1315   Gross per 24 hour   Intake              750 ml   Output              501 ml   Net              249 ml       12 HOURS CHART SIGN OFF DONE BY Facundo Gresham RN        Patient VERBALexpected daily goal for today is:    1. Pt is non-verbal. Daughter, Grover Suresh is present during rounds she discussed hospice care/comfort care. 2. Daughter verbalized that she did not want her mother in pain. That she would talk with her brother and family members about coming into town. Nurse goal for patient today:  MD made pt in-patient hospice/comfort care. Will discontinue antibiotics, continue pain medication and supportive measures.

## 2017-01-16 NOTE — PROGRESS NOTES
Problem: Self Care Deficits Care Plan (Adult)  Goal: *Acute Goals and Plan of Care (Insert Text)  Occupational Therapy Goals  Initiated 1/15/2017  1. Patient will perform self-feeding with moderate assistance within 7 day(s). 2. Patient will maintain bilateral LEs in bridge position while wearing slipper socks without assist > or = 2 minutes without cues within 7 day(s). 3. Patient will demonstrate relaxed position with UEs outstretched, digits extended and feet relaxed (versus pushing into plantarflexion) with min cues for relaxation techniques within 7 day(s). 4. Patient will perform bilateral UE AAROM with min assist within 7 day(s). OCCUPATIONAL THERAPY TREATMENT  Patient: Zuleyka Webb (9 y.o. female)  Date: 1/16/2017  Diagnosis: Sepsis ([address] Sepsis ([address]       Precautions: Fall, Skin, Aspiration  Chart, occupational therapy assessment, plan of care, and goals were reviewed. ASSESSMENT:  Pt present with eyes closed and R hand behind her head. Performed PROM to R UE in all planes as she could tolerate. Pt educated to relax her R UE down at her side however she actively flexed her elbow during instruction. L hand contracted however placed washcloth in pts palm to reduce further flexion. Pts LE's flexed in bridge position. Noted hospice consult pending. Progression toward goals:  [ ]          Improving appropriately and progressing toward goals  [ ]          Improving slowly and progressing toward goals  [ ]          Not making progress toward goals and plan of care will be adjusted       PLAN:  Patient continues to benefit from skilled intervention to address the above impairments. Continue treatment per established plan of care. Discharge Recommendations:  TBD hospice consult pending  Further Equipment Recommendations for Discharge:  None       SUBJECTIVE:   Patient did not verbalize.       OBJECTIVE DATA SUMMARY:   Cognitive/Behavioral Status:  Neurologic State: Drowsy  Orientation Level: Unable to verbalize  Cognition: Decreased attention/concentration           Functional Mobility and Transfers for ADLs:              Bed Mobility:   Not tested. Transfers:      Dependent                                                                                                  Balance: Impaired     ADL Intervention:                    Lower Body Bathing  Bathing Assistance: Total assistance(dependent)           Lower Body Dressing Assistance  Dressing Assistance: Total assistance(dependent)        Therapeutic Exercises:   PROM performed to R UE in all planes of motion as tolerated. Pain:  Pain Scale 1: Adult Nonverbal Pain Scale  Pain Intensity 1: 2                 Activity Tolerance:    Poor  Please refer to the flowsheet for vital signs taken during this treatment.   After treatment:   [ ]  Patient left in no apparent distress sitting up in chair  [X]  Patient left in no apparent distress in bed  [X]  Call bell left within reach  [X]  Nursing notified  [ ]  Caregiver present  [ ]  Bed alarm activated      COMMUNICATION/COLLABORATION:   The patients plan of care was discussed with: Physical Therapy Assistant, Occupational Therapist and Registered Nurse     DIA Tyson  Time Calculation: 15 mins

## 2017-01-16 NOTE — PROGRESS NOTES
SHIFT CHANGE:  1930 Bedside and Verbal shift change report given to Treva MOONEY (oncoming nurse) by Aiyana Horton (offgoing nurse). Report included the following information SBAR, Kardex, MAR and Recent Results. SHIFT SUMMARY:  2005  Jtube flushed with 250cc tap water. Radha Beach about patient's MAP falling to 50's, RR 6-10, and BP's 70-80's range over last hour. Dr. Margie Beach verified if patient getting IVF, patient is getting NS w/20 KCL at 75ml/hr. Patient is comfortable, without obvious distress. No new orders at this time except to keep patient comfortable. 0050  Jtube flushed with 250cc tap water. 0100  2liters nasal cannula given for patient comfort. 4471  Jtube flushed with 250cc tap water. END OF SHIFT REPORT:  0710  Bedside and Verbal shift change report given to [employer] (oncoming nurse) by Melissa Andrade (offgoing nurse). Report included the following information SBAR, Kardex, MAR and Recent Results.

## 2017-01-16 NOTE — PROGRESS NOTES
Spiritual Care Assessment/Progress Notes    Velma Gallo [id number]  [id number]    1940  67 y.o.  female    Patient Telephone Number: [phone] (home)   Jainism Affiliation: Druze   Language: English   Extended Emergency Contact Information  Primary Emergency Contact: [address] Phone: [phone]  Relation: Child  Secondary Emergency Contact: [address] Phone: [phone]  Mobile Phone: [phone]  Relation: Child   Patient Active Problem List    Diagnosis Date Noted    VRE (vancomycin-resistant Enterococci) infection 01/15/2017    Sepsis ([address] 01/13/2017    HTN (hypertension) 01/13/2017    Diabetes mellitus type 2, controlled ([address] 01/13/2017    UTI (urinary tract infection) 01/13/2017    Hyperkalemia 01/13/2017    Lactic acid acidosis 01/13/2017    PADMINI (acute kidney injury) [facility] 07/09/9460    Systolic heart failure ([facility] 12/06/2016    CVA (cerebral vascular accident) ([facility] 12/06/2016    Acute CVA (cerebrovascular accident) ([facility] 11/18/2016    S/P CABG x 3 11/14/2016    Coronary artery disease involving native coronary artery of native heart with unstable angina pectoris ([facility] 11/14/2016    On intra-aortic balloon pump assist 11/14/2016    Acute CHF (congestive heart failure) ([facility] 11/10/2016        Date: 1/16/2017       Level of Jainism/Spiritual Activity:  [x]         Involved in sotero tradition/spiritual practice    []         Not involved in sotero tradition/spiritual practice  [x]         Spiritually oriented    []         Claims no spiritual orientation    []         seeking spiritual identity  []         Feels alienated from Adventism practice/tradition  []         Feels angry about Adventism practice/tradition  [x]         Spirituality/Adventism tradition is a resource for coping at this time.   []         Not able to assess due to medical condition    Services Provided Today:  []         crisis intervention    []         reading Scriptures  [x]         spiritual assessment    [x]         prayer  [x]         empathic listening/emotional support  []         rites and rituals (cite in comments)  []         life review     []         Methodist support  []         theological development   []         advocacy  []         ethical dialog     [x]         blessing  []         bereavement support    [x]         support to family  [x]         anticipatory grief support   []         help with AMD  []         spiritual guidance    []         meditation      Spiritual Care Needs  [x]         Emotional Support  [x]         Spiritual/Sikhism Care  []         Loss/Adjustment  []         Advocacy/Referral                /Ethics  []         No needs expressed at               this time  []         Other: (note in               comments)  Spiritual Care Plan  []         Follow up visits with               pt/family  []         Provide materials  []         Schedule sacraments  []         Contact Community               Clergy  [x]         Follow up as needed  []         Other: (note in               comments)     Comments:  is responding to staff referral for family support of pt in room 336. Pt's daughter, Shannon Stover, was in the room at the time. Shannon Stover engaged in some life review remarking her mother loved to garden, before a fall several years ago. Her mother was raised Holiness. He sotero has always been important to her, even though she has not been able to remain connected to a Jainism over the past few years. Shannon Stover is [place], attending [employer], and notes she has excellent support. She notes her brother, who has lived with her mother for a while, does not have the same kind of community support and may struggle a little more with his mother's passing.  offered spiritual support through active listening, affirmation of pt's experience, encouragement, and prayer.  Spiritual care will continue to follow as able and as needed. Rev.  [id number] Barney Gutierrez M.Div, M.S, [address] [facility] available at [phone])

## 2017-01-16 NOTE — PROGRESS NOTES
Palliative Medicine Consult  [phone])    Patient Name: Delilah Tobias  YOB: 1940      Date of Initial Consult: 1-15-17  Reason for Consult: care decisions   Requesting Physician: Dr. Sheryl Grubbs   Primary Care Physician: Manuela Beltran MD          Summary:   Delilah Tobias is a 67y.o. year old with a past history of Systolic heart failure, HTN, DM2, CVA, PADMINI, s/p CABGx3, who was admitted on 1/13/2017 from rehab with a diagnosis of UTI, VRE, acute CHF, AMS, debility and weakness. Current medical issues leading to Palliative Medicine involvement include: UTI, VRE, acute CHF, AMS, debility and weakness. .       Palliative Diagnoses:   1. Weakness   2. Debility   3. AMS       Plan:   1. Family meeting: met with daughter Grover Suresh at length to discuss goals of care and a plan moving forward with hospice, she would like to take pt to her home but needs to discuss this with her brother. Pt is doing poorly after CABG several months ago and has suffered a CVA during surgery, he had been in rehab and has had a progressive decline over the past several months. Family is considering hospice care, she does have a feeding tube, I suggested completing antibiotic treatments (as this was their wish) and informed her that the feeding tube may only serve to prolong her situation and cause suffering. Family needs to discuss further the feeding tube benefit vs burden. 2. Discussed: GOC, comfort, hospice, feeding tube  3. Decisions made: continue supportive care and treatments, considering hospice, consult has been placed. 4. Pain/symptom management: continue dilaudid 0.5mg q 3hrs prn, added alprazolam 0.5 mg IV q 6hrs prn agitation  5. Coordination of care: Palliative IDT, primary nurse, case management  6. Psychosocial and emotional aspects of care: provided emotional support,  active listening and non-abandonment to daughter  7.  Disposition: to be determined      Goals of Care:   [====Goals of Care====]  Code Status: DNR   Patient / family stated goals:    [] Reverse acute illness using all generally accepted medical interventions  [] Reverse acute illness, with limitations:   [x] Comfort care  [] Other:   [====Goals of Care====]      TREATMENT PREFERENCES:   Code Status: DNR    Advance Care Planning:  Advance Care Planning 1/13/2017   Patient's Healthcare Decision Maker is: Named in scanned ACP document   Primary Decision Maker Name Abdulkadir Lopez   Primary Decision Maker Phone Number [phone]   Primary Decision Maker Relationship to Patient Adult child   Secondary Decision Maker Name Wyatt Holbrook   Secondary Decision Maker Phone Number [phone]   Secondary Decision Maker Relationship to Patient Adult child   Confirm Advance Directive Yes, not on file   Does the patient have other document types Power of        Other:    The palliative care team has discussed with patient / health care proxy about goals of care / treatment preferences for patient.  [====Goals of Care====]           Resuscitation Status: DNR   Durable DNR Status: has  Initial Consult Note routed to PCP? [x] Yes   [] No    [] No PCP listed          Thank you for including Palliative Medicine in this patient's care. Ivy June NP           HISTORY:     History obtained from: chart, physician, nursing    CHIEF COMPLAINT: AMS     HISTORY OF PRESENT ILLNESS:   Ms. Jonathan Griffith is a 67 y.o. with h/o cad, chf, dm, cva who presents with confusion. History is limited as pt is currently minimally verbal, SAH reports able to converse at baseline. Pt was recently admitted twice in at [facility]. First hospital stay she underwent CABG which was complicated by CVA with residual hemiparesis and cardiogenic shock. She was discharged and was re-admitted for acute heart failure and was diuresed. Met with daughter and we discussed goals of care and possible hospice.      The patient is: [] Verbal / [x] Nonverbal / [] Unresponsive                FUNCTIONAL ASSESSMENT: Palliative Performance Scale (PPS):  PPS: 40         PSYCHOSOCIAL/SPIRITUAL SCREENING:     Advance Care Planning:  Advance Care Planning 1/13/2017   Patient's Healthcare Decision Maker is: Named in scanned ACP document   Primary Decision Maker Name Brandt Pederson   Primary Decision Maker Phone Number [phone]   Primary Decision Maker Relationship to Patient Adult child   Secondary Decision Maker Name Xenia Jones   Secondary Decision Maker Phone Number [phone]   Secondary Decision Maker Relationship to Patient Adult child   Confirm Advance Directive Yes, not on file   Does the patient have other document types Power of         Any spiritual / Pentec[employer]al concerns:  [] Yes /  [x] No    Caregiver Burnout:  [] Yes /  [x] No /  [] No Caregiver Present      Anticipatory grief assessment:   [x] Normal  / [] Maladaptive       ESAS Anxiety: Anxiety: 3    ESAS Depression:                   REVIEW OF SYSTEMS:     The following systems were [x] reviewed / [] unable to be reviewed  Systems: constitutional, ears/nose/mouth/throat, respiratory, gastrointestinal, musculoskeletal, neurologic, psychiatric, endocrine. Positive findings noted below.   Modified ESAS Completed by: provider   Fatigue: 7 Drowsiness: 2     Pain: 0   Anxiety: 3       Dyspnea: 0     Constipation: No     Stool Occurrence(s): 1              Functional Assessment Staging (FAST) for dementia: na                    Confusion Assessment Method Diagnostic Algorithm (CAM):    CAM Score: 4  Adult Non-Verbal Pain Assessment    Face  [] 0   No particular expression or smile  [x] 1   Occasional grimace, tearing, frowning, wrinkled forehead  [] 2   Frequent grimace, tearing, frowning, wrinkled forehead    Activity (movement)  [] 0   Lying quietly, normal position  [x] 1   Seeking attention through movement or slow, cautious movement  [] 2   Restless, excessive activity and/or withdrawal reflexes    Guarding  [x] 0   Lying quietly, no positioning of hands over areas of body  [] 1   Splinting areas of the body, tense  [] 2   Rigid, stiff    Physiology (vital signs)  [x] 0   Stable vital signs  [] 1   Change in any of the following: SBP > 20mm Hg; HR > 20/minute  [] 2   Change in any of the following: SBP > 30mm Hg; HR > 25/minute    Respiratory  [x] 0   Baseline RR/SpO2, compliant with ventilator  [] 1   RR > 10 above baseline, or 5% drop SpO2, mild asynchrony with ventilator  [] 2   RR > 20 above baseline, or 10% drop SpO2, asynchrony with ventilator    Total Non-Verbal Pain Score: 2         PHYSICAL EXAM:     Wt Readings from Last 3 Encounters:   01/16/17 126 lb 12.8 oz (57.5 kg)   01/12/17 107 lb (48.5 kg)   01/12/17 120 lb 4.8 oz (54.6 kg)     Blood pressure (!) 77/45, pulse 86, temperature 97.6 °F (36.4 °C), resp. rate 20, height 5' (1.524 m), weight 126 lb 12.8 oz (57.5 kg), SpO2 100 %, not currently breastfeeding.   Pain:  Pain Scale 1: Adult Nonverbal Pain Scale  Pain Intensity 1: 2                          Last bowel movement: 1-16-17    Constitutional: pt is nonverbal, eyes open at times, she is more responsive to her daughter at the bedside  Eyes: pupils equal, anicteric  ENMT: no nasal discharge, moist mucous membranes  Cardiovascular: regular rhythm, distal pulses intact  Respiratory: breathing not labored, symmetric  Gastrointestinal: soft non-tender, +bowel sounds  Musculoskeletal: no deformity, no tenderness to palpation  Skin: warm, dry  Neurologic:pt is not able to follow commands, pt is not moving all extremities  Psychiatric: calm        HISTORY:     Principal Problem:    Sepsis ([address] (1/13/2017)    Active Problems:    S/P CABG x 3 (11/14/2016)      Overview: Emergency LIMA TO LAD; SVG TO OM; SVG to RCA UTILIZING RIGHT GREATER       SAPHENOUS VEIN      Systolic heart failure ([address] (12/6/2016)      CVA (cerebral vascular accident) ([facility] (12/6/2016)      HTN (hypertension) (1/13/2017)      Diabetes mellitus type 2, controlled ([address] (1/13/2017)      UTI (urinary tract infection) (1/13/2017)      Hyperkalemia (1/13/2017)      Lactic acid acidosis (1/13/2017)      PADMINI (acute kidney injury) ([facility] (1/13/2017)      VRE (vancomycin-resistant Enterococci) infection (1/15/2017)      Past Medical History   Diagnosis Date    Chronic pain      hips and legs    Coronary artery disease involving native coronary artery of native heart with unstable angina pectoris ([facility] 11/14/2016    Diabetes ([facility]     GERD (gastroesophageal reflux disease)     Hypertension     On intra-aortic balloon pump assist 11/14/2016     Placed in cath lab; left leg    Other ill-defined conditions(799.89)      increased cholesterol    Other ill-defined conditions(799.89)      diverticulosis    Psychiatric disorder      anxiety    Stroke ([facility]     Thyroid disease     Unspecified adverse effect of anesthesia      woke up early at end of bunionectomy      Past Surgical History   Procedure Laterality Date    Hx lap cholecystectomy      Hx urological       cytocele and rectocele repair    Hx heent       dental surgery - gum graft    Hx other surgical       colonoscopy x 2 - no polyps per pt    Hx orthopaedic       bilateral carpal tunnel    Hx orthopaedic       bilateral bunionectomy    Hx orthopaedic       partial hip replacement      Family History   Problem Relation Age of Onset    Heart Disease Mother     COPD Mother     Breast Cancer Maternal Aunt       Social History   Substance Use Topics    Smoking status: Never Smoker    Smokeless tobacco: Not on file    Alcohol use No     Allergies   Allergen Reactions    Benadryl [Diphenhydramine Hcl] Rash    Statins-Hmg-Coa Reductase Inhibitors Other (comments)     Elevated LFTS post CABG while taking med.       Sulfa (Sulfonamide Antibiotics) Rash      Current Facility-Administered Medications   Medication Dose Route Frequency    HYDROmorphone (PF) (DILAUDID) injection 1 mg  1 mg IntraVENous ONCE    HYDROmorphone (DILAUDID) injection 1 mg  1 mg IntraVENous Q3H PRN    LORazepam (ATIVAN) injection 1 mg  1 mg IntraVENous Q4H PRN    albuterol-ipratropium (DUO-NEB) 2.5 MG-0.5 MG/3 ML  3 mL Nebulization Q4H PRN    sodium chloride (NS) flush 5-10 mL  5-10 mL IntraVENous PRN    sodium chloride (NS) flush 5-10 mL  5-10 mL IntraVENous Q8H    sodium chloride (NS) flush 5-10 mL  5-10 mL IntraVENous PRN    acetaminophen (TYLENOL) tablet 650 mg  650 mg Oral Q4H PRN    ondansetron (ZOFRAN) injection 4 mg  4 mg IntraVENous Q4H PRN    sodium chloride (NS) flush 5-10 mL  5-10 mL IntraVENous Q8H    sodium chloride (NS) flush 5-10 mL  5-10 mL IntraVENous PRN    nystatin (MYCOSTATIN) 100,000 unit/gram ointment   Topical TID          LAB AND IMAGING FINDINGS:     Lab Results   Component Value Date/Time    WBC 18.8 01/16/2017 03:58 AM    HGB 10.4 01/16/2017 03:58 AM    PLATELET 054 69/79/9888 03:58 AM     Lab Results   Component Value Date/Time    Sodium 139 01/16/2017 03:58 AM    Potassium 3.2 01/16/2017 03:58 AM    Chloride 106 01/16/2017 03:58 AM    CO2 22 01/16/2017 03:58 AM    BUN 51 01/16/2017 03:58 AM    Creatinine 1.21 01/16/2017 03:58 AM    Calcium 8.0 01/16/2017 03:58 AM    Magnesium 1.9 01/16/2017 03:58 AM    Phosphorus 3.5 01/16/2017 03:58 AM      Lab Results   Component Value Date/Time    AST 29 01/15/2017 04:23 AM    Alk.  phosphatase 102 01/15/2017 04:23 AM    Protein, total 6.1 01/15/2017 04:23 AM    Albumin 2.5 01/15/2017 04:23 AM    Globulin 3.6 01/15/2017 04:23 AM     Lab Results   Component Value Date/Time    INR 1.2 11/26/2016 04:30 AM    Prothrombin time 12.3 11/26/2016 04:30 AM    aPTT 24.2 11/26/2016 04:30 AM      No results found for: IRON, FE, TIBC, IBCT, PSAT, FERR   No results found for: PH, PCO2, PO2  No components found for: Ck Point   Lab Results   Component Value Date/Time    CK 48 11/11/2016 10:13 AM    CK - MB 1.0 11/11/2016 10:13 AM                Total time: 54  Counseling / coordination time: 45  > 50% counseling / coordination?: y      Prolonged service was provided for  []30 min   []75 min in face to face time in the presence of the patient. Time Start:   Time End:   Note: this can only be billed with 06559 (initial) or 29557 (follow up). If multiple start / stop times, list each separately.

## 2017-01-16 NOTE — DISCHARGE SUMMARY
See daily note for details    Admission date: 01/13  Discharge date to inpatient hospice: 01/16  Admission diagnosis: encephalopathy, sepsis, PADMINI, hyperkalemia    66 yo hx of HTN, DM, recent CAD s/p CABG, ICM EF 15%, CVA w/ L hemiplegia, presented w/ AMS, sepsis, PADMINI, hyperkalemia. She was found to have a VRE UTI. Despite IVF, IV antibiotics, the patient's condition continued to worsen. Her family decided to make her [address] only. Hospice will admit the patient to inpatient hospice to control abdominal pain and dyspnea.       Current meds:  Current Facility-Administered Medications   Medication Dose Route Frequency    LORazepam (ATIVAN) injection 1 mg  1 mg IntraVENous Q4H PRN    HYDROmorphone (DILAUDID) injection 0.2 mg  0.2 mg IntraVENous Q3H PRN    HYDROmorphone (PF) (DILAUDID) injection 0.5 mg  0.5 mg IntraVENous Q3H PRN    albuterol-ipratropium (DUO-NEB) 2.5 MG-0.5 MG/3 ML  3 mL Nebulization Q4H PRN    sodium chloride (NS) flush 5-10 mL  5-10 mL IntraVENous PRN    sodium chloride (NS) flush 5-10 mL  5-10 mL IntraVENous Q8H    sodium chloride (NS) flush 5-10 mL  5-10 mL IntraVENous PRN    acetaminophen (TYLENOL) tablet 650 mg  650 mg Oral Q4H PRN    ondansetron (ZOFRAN) injection 4 mg  4 mg IntraVENous Q4H PRN    sodium chloride (NS) flush 5-10 mL  5-10 mL IntraVENous Q8H    sodium chloride (NS) flush 5-10 mL  5-10 mL IntraVENous PRN    nystatin (MYCOSTATIN) 100,000 unit/gram ointment   Topical TID

## 2017-01-17 NOTE — PROGRESS NOTES
1328: Pt in pain. Daughter at bedside. Pt keeps stating Ezra Postin it hurts so bad. \" However, pt hypotensive. Attempted to contact MD Ahmadi about the previous. No data found. 1331: Notified MD Ahmadi of the above. MD stated he will be up to take a look at pt. Will continue to monitor. 1400: MD Do in for rounds & pt daughter agreed to comfort care. Verbal order received from MD to give pt 1 mg IV Dilaudid, stop IVF & ATBs. 1529: Pt having long periods of apnea after receiving pain medication. RN Ondina Baez w/ hospice recommended to lower pain medication dose. Texted MD Ahmadi about the previous. Received order to lower Dilaudid dose to 0.5 mg IV.  1920: Bedside and Verbal shift change report given to Treva MOONEY (oncoming nurse) by Mya MOONEY (offgoing nurse). Report included the following information SBAR, Kardex, Intake/Output, Recent Results and Cardiac Rhythm NSR.

## 2017-01-17 NOTE — HOSPICE
[facility]  [phone]    Inpatient Nursing Admission   Patient Name: Margaret Lam  YOB: 1940  Age: 67 y.o. Date of Hospice Admission: 1/16/2017  Hospice Attending: Rebecca Mata MD  Primary Care Physician: Josue Corado MD  Admitting RN: Jessie Sabillon. Saima Sheikh RN  : Not Present at Admission    Level of Care (GIP/Routine/Respite): Routine  Facility of Care: [facility]  Patient Room: 2/     HOSPICE SUMMARY   ER Visits/ Hospitalizations in past year: 1/13/17 Sepsis 12/5/16 CHF 11/10/16 Acute CHF/CAD/CABG x 3  Hospice Diagnosis: Sepsis  Sepsis ([address]  Onset Date of Hospice Diagnosis: 1/13/17    Co-Morbidities:   Patient Active Problem List   Diagnosis Code    Acute CHF (congestive heart failure) ([facility]) I50.9    S/P CABG x 3 Z95.1    Coronary artery disease involving native coronary artery of native heart with unstable angina pectoris ([facility]) I25.110    On intra-aortic balloon pump assist Z98.890    Acute CVA (cerebrovascular accident) ([address] S77.5    Systolic heart failure ([address] I50.20    CVA (cerebral vascular accident) ([address] I63.9    Sepsis ([address] A41.9    HTN (hypertension) I10    Diabetes mellitus type 2, controlled ([address] E11.9    UTI (urinary tract infection) N39.0    Hyperkalemia E87.5    Lactic acid acidosis E87.2    PADMINI (acute kidney injury) ([address] N17.9    VRE (vancomycin-resistant Enterococci) infection A49.1, Z16.21     Diagnoses RELATED to the terminal prognosis:   Other Diagnoses:     Rationale for a prognosis of life expectancy of 6 months or less if the disease follows its normal course (Disease Specific History): Margaret Lam is a 67 y.o. who was admitted to [facility]. The patient's principle diagnosis of sepsis has resulted in functional decline, AMS, restlessness, generalized pain.   Functionally, the patient's Palliative Performance Scale has declined over a period of 2 months and is estimated at 20. Objective information that support this patients limited prognosis includes: 1/16/17 WBC 18.8  1/13/17 Lactic Acid 3.3 Pro-BMP 9938 1/15/17 Urine Culture results:VANCOMYCIN RESISTANT ENTEROCOCCUS FAECIUM. The patient/family chose comfort measures with the support of Hospice.     Prognosis estimated based on 01/17/17 clinical assessment is:   [] Few to Many Hours  [x] Hours to Days   [] Few to Many Days   [] Days to Weeks   [] Few to Many Weeks   [] Weeks to Months   [] Few to Many Months    ADVANCE CARE PLANNING (Complete in ACP Flow Sheet)   Code Status: DNR  Durable DNR: _ Yes  X No  Advance Care Planning 1/17/2017   Patient's Healthcare Decision Maker is: Named in scanned ACP document   Primary Decision Maker Name Ignacio Barber   Primary Decision Maker Phone Number [phone]   Primary Decision Maker Relationship to Patient Adult child   Secondary Decision Maker Name Dianne Wallace    Secondary Decision Maker Phone Number -   Secondary Decision Maker Relationship to Patient Adult child   Confirm Advance Directive Yes, not on file   Does the patient have other document types Power of [employer]: [] Yes  [x]  No      [] Unknown  Appropriate for Pinning Ceremony:  [] Yes     [] No  Mandaen: Advent /Catholic    ASSESSMENT    Quality Measure: Patient self-reports:  []  Yes    [x] No    SYMPTOMS: pain, restlessness, agonal respirations with periods of apnea    ESAS:   Time of Assessment: 4:30 AM  Pain (1-10): 2  Shortness of breath (1-10): 0  Nausea (1-10): 0  Constipation: []  Yes [x] No  Last BM 1/16/17    FALL RISK:  [] Low   [] Moderate    [x] High    [] Not able to assess    KARNOFSKY: 10    PRESSURE ULCERS   Dax Scale (pressure ulcer risk): n/a    FAST for all dementia: n/a    Progression to DEPENDENCE WITH ADLs (include time frame):   [x] Dependent for bathing: personal hygiene and grooming   [x] Dependent for dressing: dressing and undressing   [x] Dependent for transferring: movement and mobility   [x] Dependent for toileting: continence-related tasks including control and hygiene   [x] Dependent for eating: preparing food and feeding    CLINICAL INFORMATION   Mid-arm Circumference (MAC):        Wt Readings from Last 3 Encounters:   01/16/17 57.5 kg (126 lb 12.8 oz)   01/12/17 48.5 kg (107 lb)   01/12/17 54.6 kg (120 lb 4.8 oz)     There is no height or weight on file to calculate BMI. Visit Vitals    BP (!) 80/49 (BP 1 Location: Right arm, BP Patient Position: At rest)    Pulse 88    Temp 96.7 °F (35.9 °C)    Resp 10    SpO2 96%    Breastfeeding No       Currently this patient has:  [x] Supplemental O2 [x] Peripheral IV  [] PICC    [] PORT   [x] Boles Catheter [] NG Tube   [] PEG Tube [] Ostomy    [] AICD: Has ICD been deactivated? [] Yes [] No:______    SIGNS/PHYSICAL FINDINGS: Intermittently opens eyes but unable to track to verbal or tactile stimulation. Hypotensive. RR 4 with periods of apnea up to 40 seconds. Unlabored. Lungs diminished. Abdomen soft and nontender. Incontinent of bowel. Boles draining wilfrido urine. LAB VALUES  No results found for this visit on 01/16/17 (from the past 12 hour(s)). No results found for this visit on 01/16/17 (from the past 6 hour(s)). Lab Results   Component Value Date/Time    Protein, total 6.1 01/15/2017 04:23 AM    Albumin 2.5 01/15/2017 04:23 AM       ALLERGIES AND MEDICATIONS     Allergies: Allergies   Allergen Reactions    Benadryl [Diphenhydramine Hcl] Rash    Statins-Hmg-Coa Reductase Inhibitors Other (comments)     Elevated LFTS post CABG while taking med.       Sulfa (Sulfonamide Antibiotics) Rash     Current Facility-Administered Medications   Medication Dose Route Frequency    saline peripheral flush soln 5 mL  5 mL InterCATHeter PRN    nystatin (MYCOSTATIN) 100,000 unit/gram cream   Topical TID    LORazepam (ATIVAN) injection 0.5 mg  0.5 mg IntraVENous Q15MIN PRN    bisacodyl (DULCOLAX) suppository 10 mg  10 mg Rectal DAILY PRN    HYDROmorphone (PF) (DILAUDID) injection 0.5 mg  0.5 mg IntraVENous Q15MIN PRN    acetaminophen (TYLENOL) suppository 650 mg  650 mg Rectal Q4H PRN    atropine 1 % ophthalmic solution 3 Drop  3 Drop SubLINGual Q4H PRN       ASSESSMENT & PLAN     1. Admit to comfort bed, routine level of care   2. PRN dilaudid 0.5 mg IV every 15 minutes as needed for management of pain or dyspnea   3. PRN lorazepam 0.5 mg IV every 15 minutes as needed for management of anxiety, agitation   4. Symptom medications PRN for management of fever, constipation, secretions   5. IDT for support of patient and family to include MD, RN, SW and [address] [place] [address]  6. Discharge disposition: Son home with hospice if stable enough to transfer      CAREGIVER SUPPORT:  [x] Provided information on End of Life Care   [x] Material Provided: Onalee Finders From [facility] or [facility]     1. FALL INTERVENTIONS PROVIDED (if the fall risk is >10,  the intervention below was provided)  [x] Implement/recommend assistive devices and encouraged their use (use full bed rails, etc)  [x] Implemented/recommended resources for alar, system (personal alarm, bed alarm, call bell, etc)  [x] Implemented/recommended environmental changes (remove hazards, lower bed, improper lighting, etc.)  [x] Implemented/recommended increased supervision/assistance  [] Scheduled PT and/or OT evaluation for mobility/transfer techniques and/or assistive device recommendations    2. Initial Bereavement POC  [] LOW RISK Indications: normal grief, good support, opens expression. [x] MODERATE RISK Indications: grief normal but severe, depression, somatic distress, low support  [] HIGH RISK indications: Hx of mental illness, suicidal ideation, depression, somatic distress, excessive guilt or anger, multiple losses, other life crisis. 3. Oxygen SAFETY and Anticoagulation SAFETY: Only if being transferred to home environment    4.  Copies of Election of Benefit and Hospice Consent:  [x] Hospice Folder Provided  [x] See Electronic Health Records for past medical records    5. Spiritual Issues Identified: none at this time    6. Psych/ Social/ Emotional Issues Identified: Education materials provided for EOL nutrition, EOL s/sx. Emotional support and active listening provided. 7.  Care Coordination:   Rosibel Salcedo NP contacted for medication reconciliation, hospice services and treatment  MEDS: See medication list above  DME: Per hospital/hospice house  Supplies: Per hospital/hospice house   Home: [facility]    9. Hospice Team Orders  [x] Skilled Nurse -  every other day while admitted to hospital / hospice house  [x] MSW  1 visit and then prn for assessment/evaluation for family support and need for volunteer services  [x]   1 visit and then prn for assessment/evaluation for spiritual support.   services will contact / Clinical Manager for further orders  [] Hospice Aide to be evaluated by  (if patient being transferred to home environment)

## 2017-01-17 NOTE — PROGRESS NOTES
Bedside and Verbal shift change report given to Malick Diamond RN (oncoming nurse) by Sudarshan James RN (offgoing nurse). Report included the following information SBAR and Kardex.

## 2017-01-17 NOTE — HOSPICE
[facility] Help to Those in Need  [phone]    Social Work Admission Note  Patient Name: Jamie Magaña  YOB: 1940  Age: 67 y.o. Date of Visit: 17  Facility of Care: [facility]  Patient Room: 522/01     Hospice Attending: Cliff Rosa MD  Hospice Diagnosis: Sepsis  Sepsis ([facility] [address]    Level of Care:    []  GIP    []  Respite   [x]  Routine    NARRATIVE   67year old  patient admitted for comfort bed hospice under diagnosis of Sepsis. Patient has been ill for some time, entering hospitals and rehab facilities on and off and ended up back in the hospital after attempting rehab. She was at [facility] for about 2 months recently. Patient is  (since early ) and lived with her son, Candice Blackman in [facility].  She also has a daughter, Sandy Tejada who lives in [facility] and who I met today for this assessment. [employer] shared that patient's son (her brother) is on disability for mental health conditions and he is having a somewhat difficult time with patient prognosis and current condition. According to daughter, patient and her son relied heavily on each other emotionally and this anticipated loss will be a life altering situation for her brother. She and her brother were not close until patient became ill and they do communicate with each other more now. Her brother does have a mental health counselor who he \"sees regularly\". Patient is minimally responsive at this time. She looks comfortable and is not in any pain or distress, which is in contrast to yesterday when patient was in pain per daughter. [employer] shared that it was \"tough to make this decision\" but she is glad that patient is no longer suffering. She is planning to spend the night today with patient. Her brother did stay with patient last night. Patient is described as a \"homebody\" per daughter, [employer]. Patient liked to garden and take care of her home. She is of the [employer].  arrangements are via [employer] . This LCSW will plan to meet patient's son perhaps tomorrow when he is here. Emotional support and end of life education provided to Naseem Welch. She notes she and her brother will probably be interested in bereavement services. Will ask bereavement to stay in touch with both of them. Naseem Welch noted that it would be better for her brother if patient could stay in the hospital for care due to his anxieties. I shared that we assess daily for appropriateness of inpatient care and that we would work with the family if an alternate discharge needs to be arranged. For now, encouraged Naseem Welch to spend time with her mother. ADVANCE CARE PLANNING    Code Status: DNR  Durable DNR: _ Yes  X_ No  Advance Care Planning 2017   Patient's Healthcare Decision Maker is: Named in scanned ACP document   Primary Decision Maker Name Karo Connor   Primary Decision Maker Phone Number [phone]   Primary Decision Maker Relationship to Patient Adult child   Secondary Decision Maker Name Amanda Wiggins    Secondary Decision Maker Phone Number -   Secondary Decision Maker Relationship to Patient Adult child   Confirm Advance Directive Yes, not on file   Does the patient have other document types Power of        Relationship Status:  []  Single     []        []      []  Domestic Partner     [x]  /  []  Common Law  []    []  Unknown    If in a relationship, name of partner/spouse:  Duration of relationship:    Catholic: Evangelical     Home: [facility]  Resources Provided: Daughter given information about bereavement services. Very interested in this for self and brother.       Social Work Initial Assessment     Gender:  female    Race/Ethnicity: (fabian all that apply)  []  [place]) or [place] Native  []    []  Black or [place] American  []   or   []   or [place]  [x]  White  []  Unknown      Service:    []  Yes   [x]  No       [] Unknown  Appropriate for Pinning Ceremony:   []  Yes      []  No  Is patient using VA benefits? []  Yes      []  No     Primary Language:English  []   Needed  []   utilized during visit    Ability to express thoughts/needs/feelings  []  Expressed thoughts/feelings/needs without difficulty  []  Requires extra time and cuing  []  Speech limited single words  []  Uses only gestures (eye, blinking eye or head movement/pointing)  []  Unable to express thoughts/feelings/needs (speech unintelligible or inappropriate)  [x]  Unresponsive  Notes:      Mental Status:  []  Alert-oriented to:     []  Person     []  Place     []  Time  [x]  Comatose-responds to:    [x]   Verbal stimuli - at times   []  Tactile stimuli    []  Painful stimuli  []  Forgetful  []  Disoriented/Confused  []  Lethargic  []  Agitated  []  Other (specify):    Notes:      Patients description of Illness/Current Health Status:    [x]  Patient unable to discuss  []  Patient unwilling to discuss  []  (Specify)        Knowledge/Understanding of Disease Process  Patient:    []  Demonstrates knowledge/understanding of disease process   []  Demonstrates knowledge/understanding of treatment plan   []  Demonstrates knowledge/understanding of prognosis   []  Demonstrates acceptance of prognosis   []  Demonstrates knowledge/understanding of resuscitation status   []  Other (specify)  Caregiver:   [x]  Demonstrates knowledge/understanding of disease process   [x]  Demonstrates knowledge/understanding of treatment plan   [x]  Demonstrates knowledge/understanding of prognosis   [x]  Demonstrates acceptance of prognosis   [x]  Demonstrates knowledge/understanding of resuscitation status   []  Other (specify)  Notes:      Patients living arrangement/care setting:  Use the PRIOR COLUMN when the PATIENTS current health status necessitated a change in his/her primary residence.     Prior Current Response              [x]             []    Patients own home/residence              []             []    Home of family member/friend              []             []    Boarding home              []             []    Assisted living facility/penitentiary center              []             []    Hospital/Acute care facility              []             []    Skilled nursing facility              []             []    Long term care facility/Nursing home              []             [x]    Hospice in Patient - comfort bed     Primary Caregiver:  []  No Primary Caregiver  Name of Primary Caregiver: Sandra Adan \" Alex\"  Naima Pacheco   Relationship or Primary Caregiver:    []  Spouse/Significant other       [x]  Natural Child        []  Step child       []  Sibling   []  Parent   []  Friend/Neighbor   []  Community/Cheondoism Volunteer   []  Paid help   []  Other (specify):___________  Notes:  Patient also has a daughter, Rosealee Homans, who lives in [place]. [facility] lives [place]      Family members/Significant others:  Name: James Gill  Relationship: Son  Phone Number : [phone] C  Actively involved in care? [x]  Yes  []  No    Name: Rosealee Homans  Relationship: Daughter  Phone Number: [phone] C  Actively involved in care?   [x]  Yes  []  No    Social support systems: (select ONE best description)  []  Excellent social support system which includes three or more family members or friends  [x]  Good social support system which includes two or less members or friends  []  [address] support which includes one family member or friend  []  Poor social support; no family members or friends; basically ALONE  Notes:      Emotional Status: (fabian all that apply) for daughter, Rosealee Homans    Patient Caregiver Response                 []                [x]    Mood/Affect stable and appropriate                   []                []    Angry                 []                []    Anxious                 []                []    Apprehensive                 []                []    Avoidant                 [] []    Clinging                 []                []    Depressed                 []                []    Distraught                 []                []    Elated                 []                []    Euphoric                 []                []    Fearful                 []                []    Flat Affect                 []                []    Helpless                 []                []    Hostile                 []                []    Impulsive                 []                []    Irritable                 []                []    Labile                 []                []    Manic                 []                []    Restlessness                 []                []    Sad                 []                []    Suspicious                 []                []    Tearful                 []                []    Withdrawn     Notes: Plan to meet son tomorrow. Coping Skills (strengths/weakness):    Patient: Coping Skills (strength/weakness): Not responsive   Family/caregiver (strength/weakness): Accepting and realistic     [place] of care (fabian all that apply):     [x]  No burden evident   []  Family must administer medications   []  Illness causing financial strain   []  Family/Support feels overwhelmed   []  Family/Support sleep disturbed with patients care   []  Patients care causes extra physical stress  of death  []  Illness causes changes in family lifestyle  []  Illness impacting family/support employment  []  Family experiencing increased time demands  []  Patients behavior endangers family  []  Denial of patients illness  []  Concern over outcome of illness/fear  []  Patients behavior embarrassing to family   Notes:  If patient has to go home, son may need extra emotional support.      Risk Factors: (fabian all that apply):    []  No burden evident   []  Alcohol abuse  []  Financial resources inadequate to meet basic needs (food/house/etc)  []  Financial resources inadequate to meet health care needs (supplies/equipment/medications)  []  Food/nutrition resources inadequate  []  Home environment unsafe/inadequate for home care  []  Homicidal risk  []  Lives alone or without concerned relatives  []  Multiple medications/complex schedule  []  Physical limitations increase likelihood of falls  []  Plan of care/treatments complicated  []  Substance use/abuse  []  Suicidal risk  []  Visual impairment threatens safety/ability to perform self-care  [x]  Other (specify): Son on disability for mental health issues. Has a counselor, per daughter.     Abuse/Neglect (actual/potential risks):  [x]  No signs of abuse/neglect  []  History of abuse/neglect                 []  [id number] []  Sexual  []  History of domestic violence  []  Lacks adequate physical care  []  Lacks emotional nurturing/support  []  Lacks appropriate stimulation/cognitive experiences  []  Left alone inappropriately  []  Lacks necessary supervision  []  Inadequate or delayed medical care  []  Unsafe environment (i.e guns/drug use/history of violence in the home/etc.)  []  Bruising or other physical signs of injury present  []  Other (specify):  Notes:   []  Refer to child/adult protective services      Current Sources of Stress (in Addition to Current Illness):   [x]  None reported  []  Bills/Debt    []  Career/Job change    []   (short term)    []   (long term)    []  Death of a child (recent)    []  Death of a parent (recent)   []  Death of a spouse (recent)   []  Employment status changed   []  Family discord    []  Financial loss/Inadequate inther (specify):come  []  Job loss  []  Legal issues unresolved  []  Lifestyle change  []  Marital discord  []  Marriage within the last year  []  Paperwork (insurance/legal/etc) overwhelming  []  Separation/Divorce  []  Other (specify):  Notes:      Current [employer] Being Utilized     1. None at this time.  Apparently patient son has a mental health counselor. Interventions/Plan of Care     1. Assess social and emotional factors related to coping with end of life issues  2. Community resource planning/referral   3. Relocation to different care setting if/when symptoms stabilize  4. Discharge Planning     1. Address and assist if needed. Hospice Bereavement Assessment     1/16/2017    Bereaved Name: Hubert Lee  Address: [address] [place] [address]. [place] [phone]  Bereaved Date of Birth  Bereaved Gender:  [x]  Female  []  Male  Date Assessment Completed: 01/17/17    Relationship to the Patient:  []  Spouse/Significant other    []  Parent  [x]  Natural child      []  Step child   []  friend/Neighbor  []  Sibling  []  Other (specify):     Primary Caregiver  []  Yes    [x]  No     Social Support Systems  [x]  Excellent social support system which includes three or more willing family members or friends  []  Good social support system which includes two or less willing family members or friends  []  [address] support which includes one willing family member or friend  []  Poor social support; no willing family members or friends; basically ALONE     Frequency of Contact/Communication with Family and Friends  [x]  Daily : , has 3 daughters  Ages 24,15 and 15)  []  Three or more times a week  []  One to two times a week  []  Two to three times per month  []  At least monthly  []  Less often than monthly     Community Support Groups/Counseling Services Currently Used:   [x]  None  []  Bereavement support group   Specify support group:   []  Behavioral Support group   Specify support group:   []  Cancer support group    Specify support group:   []  Mental health support/counseling  Specify support group:   []  Other (specify)     Sources of Stress/Grief in Addition to Patients Current illness or Death:    []  None reported  []  Bills/Debt    []  Career/Job change     []   (short term)  []   (long term)     [] Death of child      []  Death of parent    []  Death of spouse   []  Employment status changed     []  Family discord     []  Financial      []  Financial loss/Inadequate income  []  Job loss  []  Lifestyle change  []  Marital discord  []  Marriage within the last year  []  Separation/Divorce. []  Legal issues unresolved  []  Paperwork (insurance/legal/etc.) overwhelming  []  Personal illness/injury  [x]  Other (specify): Brother who lived with patient on disability for mental health. He has a counselor, but she feels he may need support.      Stress Level Reported   [] 1      [] 2      [x] 3      [] 4      [] 5     [] 6      [] 7      [] 8      [] 9      [] 10  []  No Stress         []  Maximum Stress     Support Notes:      Emotional Behavior  Emotional Status:    [x]  NO SIGNIFICANT FINDINGS  []  Agitation/Restless      []  Angry       []  Anxious     []  Avoidant       []  Bereavement /loss issues     []  Clinging     []  Depressed       []  Distraught  []  Euphoric   []  Fearful   []  Flat affect  []  Helpless  []  Hostile   []  Irritable  []  Labile  []  Potential suicide risk  []  Sad  []  Strained family/social relationships  []  Suspicious  []  Tearful  []  Withdrawn         Coping of Caregiver: Check coping mechanisms observed during assessment  []  Confrontive coping (describes aggressive efforts to alter the situation and suggests some degree of hostility and risk taking)  []  Distancing (describes cognitive efforts to detach oneself and to minimize the significance of the situation)  []  Self-Controlling 9describes efforts to regulate ones own feelings)  []  Social Support (describes efforts to seek informational support, tangible support and emotional support)  [x]  Accepting (acknowledges ones own role an doing inner work that promotes personal peace)  []  Resignation  surrender (to accept no longer fight, to make peace w/circumstances)  []  Escape - Avoidance- Denial (describes wishful thinking and behavioral efforts to escape or avoid)  []  Planful Problem Solving (describes deliberates [id number] efforts to alter the situation)  []  Positive Reappraisal (describes efforts to create positive meaning by focusing on personal growth. It also has a Islam dimension)  []  Other:     Significant behavior changed noted:   [x]  None  []  Alcohol use/abuse  []  Arrest or problems with law enforcement  []  Emotional lability  []  Increased incidence of physical illness/symptoms  []  Loss of interest in activities  []  School performance affected   []  Sleeping patters/habits altered  []  Substance/Drug use and/or abuse  []  Smoking (underage or excessive)  []  Truancy  []  Other:      Emotional/Behavior Notes: None to note    []  Suicidal Ideation Expressed/Discussed : None  []  Homicidal Ideation Expressed/Discussed : None           Coping skills (strengths/weaknesses): Realistic     Support Services Needed/Referrals Required: Radha Chew all that apply)     Suggested Resources  []         []  Financial management/counseling  []  Final arrangements/ planning  []  Food/Nutrition resources  []  Home maintenance/repairs/ services  []  Homemaker services  []  Income/Medical coverage assistance  []  Legal Assistance   []  Mental health referral   []  Protective services  []  Relocation to different care setting  []  Transportation   []  Other:             Bereavement Follow-Up Plan: Regular mailings.  Please also send mailings and a card to her brother, who lived with patient and is MPOA:   [address] , [address] [place] [address] # [phone]         Financial  [x]  Independent: Manages financial affairs without assistance  []  Minimal Assistance: Needs prompting/reminders to pay bills/make deposits/cash checks or manage accounts  []  Moderate Assistance: Needs supervision of all financial tasks  []  Total Assistance: Unable to manage her/his own financial affairs  [] Unknown  Notes     Survivor Risk Assessment Summary (Actual or Potential Risks to the Bereavement Process):     []  Abuse or history of abuse observed or reported within family system  []  Concurrent stressful events or situations observed or reported  []  Dependent children reside within household  []  Family discord exhibited/described  []  Feelings of guilt expressed by family members  []  Financial status significantly affected by illness/death  []  Marital discord exhibited/described  []  Neglect observed or reported within the family system  []  Patient is a single-parent with dependent children  []  Psychiatric illness (or history) reported  []  Social Support systems limited  []  Spiritual distress observed or reported  []  Survivor frail/elderly/dependent  []  Survivor showing emotional and/or physical signs of stress/distress  []  Other (specify):  Notes:     Cultural Perspective Regarding Death:    []  Yes    [x]  No  Cultural Notes     Bereavement Assessment:     [x]  LOW RISK Indications:    [x]  normal grief   [x]  good support    [x]  open expression      [x]  MODERATE RISK Indications: For her brother, Preet Field, who has never lived alone and is on disability for mental health issues. []  grief normal but severe    []  depression (taking meds. professional help)   []  somatic distress   []  low support    []  Hx of difficulty w/ loss    []  unresolved conflict w/ the .  Jenny Zhao  []  HIGH RISK Indications:    []  Hx mental illness    []  Suicidal ideation    []  Depression (no meds.  or prof. help)   []  Somatic distress   []  Excessive guilt or anger    []  Multiple losses    []  unresolved losses , other life crises.        MSW Assessment Completed by: MARIANO Vasquez  17    Time In: 3:00 PM       Time Out : 4:00 PM      Hospice Bereavement Assessment     2017    Bereaved Name: Feng Arvizu \"KAILEE\"  Address: [facility] [address] [place] [address]  Phone: [phone] G/ [phone] H  Bereaved Date of Birth: 45years old  Bereaved Gender:  []  Female  [x]  Male  Date Assessment Completed: 01/18/17    Relationship to the Patient:  []  Spouse/Significant other    []  Parent  [x]  Natural child      []  Step child   []  friend/Neighbor  []  Sibling  []  Other (specify):     Primary Caregiver  [x]  Yes    []  No     Social Support Systems  []  Excellent social support system which includes three or more willing family members or friends  []  Good social support system which includes two or less willing family members or friends  [x]  [address] support which includes one willing family member or friend  []  Poor social support; no willing family members or friends; basically ALONE     Frequency of Contact/Communication with Family and Friends  []  Daily  []  Three or more times a week  []  One to two times a week  []  Two to three times per month  [x]  At least monthly  []  Less often than monthly     Community Support Groups/Counseling Services Currently Used:   []  None  []  Bereavement support group   Specify support group:   []  Behavioral Support group   Specify support group:   []  Cancer support group    Specify support group:   [x]  Mental health support/counseling  Specify support group: Has a long time counselor, History of depression. Is seeing him regularly. []  Other (specify)     Sources of Stress/Grief in Addition to Patients Current illness or Death:    []  None reported  []  Bills/Debt    []  Career/Job change     []   (short term)  []   (long term)     []  Death of child      [x]  Death of parent    []  Death of spouse   []  Employment status changed     []  Family discord     []  Financial      []  Financial loss/Inadequate income  []  Job loss  []  Lifestyle change  []  Marital discord  []  Marriage within the last year  []  Separation/Divorce.   []  Legal issues unresolved  []  Paperwork (insurance/legal/etc.) overwhelming  []  Personal illness/injury  [x]  Other (specify): Father  in , [place] has a dog, \"Goober\" that he cares for. Stress Level Reported   [] 1      [] 2      [] 3      [] 4      [x] 5     [] 6      [] 7      [] 8      [] 9      [] 10  []  No Stress         []  Maximum Stress     Support Notes: Few to no other supports but has reconnected with his sister and has a therapist. Noted that he has one good, close friend but he hasn't contacted him lately.  He was open to using bereavement for support.     Emotional Behavior  Emotional Status:    []  NO SIGNIFICANT FINDINGS  []  Agitation/Restless      []  Angry       []  Anxious     []  Avoidant       []  Bereavement /loss issues     []  Clinging     []  Depressed       []  Distraught  []  Euphoric   []  Fearful   []  Flat affect  []  Helpless  []  Hostile   []  Irritable  []  Labile  []  Potential suicide risk  []  Sad  []  Strained family/social relationships  []  Suspicious  [x]  Tearful  []  Withdrawn         Coping of Caregiver: Check coping mechanisms observed during assessment  []  Confrontive coping (describes aggressive efforts to alter the situation and suggests some degree of hostility and risk taking)  []  Distancing (describes cognitive efforts to detach oneself and to minimize the significance of the situation)  []  Self-Controlling 9describes efforts to regulate ones own feelings)  []  Social Support (describes efforts to seek informational support, tangible support and emotional support)  [x]  Accepting (acknowledges ones own role an doing inner work that promotes personal peace)  [x]  Resignation  surrender (to accept no longer fight, to make peace w/circumstances)  []  Escape - Avoidance- Denial (describes wishful thinking and behavioral efforts to escape or avoid)  []  Planful Problem Solving (describes deliberates [id number] efforts to alter the situation)  []  Positive Reappraisal (describes efforts to create positive meaning by focusing on personal growth. It also has a Temple dimension)  []  Other:     Significant behavior changed noted:   [x]  None  []  Alcohol use/abuse  []  Arrest or problems with law enforcement  []  Emotional lability  []  Increased incidence of physical illness/symptoms  []  Loss of interest in activities  []  School performance affected   []  Sleeping patters/habits altered  []  Substance/Drug use and/or abuse  []  Smoking (underage or excessive)  []  Truancy  []  Other:      Emotional/Behavior Notes:    []  Suicidal Ideation Expressed/Discussed : None noted  []  Homicidal Ideation Expressed/Discussed: None           Coping skills (strengths/weaknesses): Has a therapist, his sister is available to him, open to bereavement. Not particularly [place], wrestles with the \" afterlife\".     Support Services Needed/Referrals Required: Michael Hodge all that apply)     Suggested Resources  []         []  Financial management/counseling  []  Final arrangements/ planning  []  Food/Nutrition resources  []  Home maintenance/repairs/ services  []  Homemaker services  []  Income/Medical coverage assistance  []  Legal Assistance   []  Mental health referral   []  Protective services  []  Relocation to different care setting  []  Transportation   []  Other:             Bereavement Follow-Up Plan: Moderate risk due to hx of depression and on disability for this. Please call him.  He is very open to support.          Financial  [x]  Independent: Manages financial affairs without assistance  []  Minimal Assistance: Needs prompting/reminders to pay bills/make deposits/cash checks or manage accounts  []  Moderate Assistance: Needs supervision of all financial tasks  []  Total Assistance: Unable to manage her/his own financial affairs  []  Unknown  Notes     Survivor Risk Assessment Summary (Actual or Potential Risks to the Bereavement Process):     []  Abuse or history of abuse observed or reported within family system  []  Concurrent stressful events or situations observed or reported  []  Dependent children reside within household  []  Family discord exhibited/described  []  Feelings of guilt expressed by family members  []  Financial status significantly affected by illness/death  []  Marital discord exhibited/described  []  Neglect observed or reported within the family system  []  Patient is a single-parent with dependent children  []  Psychiatric illness (or history) reported  []  Social Support systems limited  []  Spiritual distress observed or reported  []  Survivor frail/elderly/dependent  []  Survivor showing emotional and/or physical signs of stress/distress  []  Other (specify):  Notes:     Cultural Perspective Regarding Death:    []  Yes    []  No  Cultural Notes: No particular sotero, No [place] connections. Struggles with finding meaning at times. Does want his mother to be with her relatives that have  (her  and her mother).     Bereavement Assessment:     []  LOW RISK Indications:    []  normal grief   []  good support    []  open expression      [x]  MODERATE RISK Indications:    []  grief normal but severe    [x]  depression (taking meds. professional help)   []  somatic distress   [x]  low support    []  Hx of difficulty w/ loss    []  unresolved conflict w/ the .  Avery Mode  []  HIGH RISK Indications:    []  Hx mental illness    []  Suicidal ideation    []  Depression (no meds.  or prof. help)   []  Somatic distress   []  Excessive guilt or anger    []  Multiple losses    []  unresolved losses , other life crises.        MARIANO Assessment Completed by: MARIANO Roger  17    Time In: 12:30      Time Out: 1:00

## 2017-01-17 NOTE — PROGRESS NOTES
SHIFT CHANGE:  1930 Bedside and Verbal shift change report given to Treva MOONEY (oncoming nurse) by Christophe Cruz (offgoing nurse). Report included the following information SBAR, Kardex, MAR and Recent Results. SHIFT SUMMARY:  2120  Patient medicated for pain, dilaudid . 5mg as prescribed. Will watch for effectiveness. 2149  Blood sugar checked for HS, down to 39. Consulted Dr. Stephy Stuart, no orders at this time. END OF SHIFT REPORT:  Bedside and Verbal shift change report given to Eron Galvin (oncoming nurse) by Sundeep Maher (offgoing nurse). Report included the following information SBAR, Kardex, Intake/Output and Recent Results.

## 2017-01-17 NOTE — PROGRESS NOTES
[address] [facility] Help to Those in Need  [phone]    Patient Name: Papito Deutsch  YOB: 1940    Date of Provider Hospice Visit: 01/17/17    Level of Care:   [] General Inpatient (GIP)    [x] Routine   [] Respite    Location of Care:  [] [facility] [place] [x] [facility] [] [facility] [] [facility] [] Hospice [facility]  [] Home [] Other:      Date of Original Hospice Admission: 1-16-17  Hospice Attending: Mariluz Madden Hospice Diagnosis:  CVA, sepsis  Diagnoses RELATED to the terminal prognosis: AMS, EF15%, VRE UTI with sepsis        HOSPICE NARRATIVE COMPOSED BY PHYSICIAN   Rationale for a prognosis of life expectancy of 6 months or less if the disease follows its normal course:       Ms. Selene Suarez is a 67 y.o. with h/o cad, chf, dm, cva who presents with confusion. History is limited as pt is currently minimally verbal, SAH reports able to converse at baseline. Pt was recently admitted twice in at [facility]. First hospital stay she underwent CABG which was complicated by CVA with residual hemiparesis and cardiogenic shock. She was discharged and was re-admitted for acute heart failure and was diuresed. Met with daughter and we discussed goals of care and possible hospice Pt is now septic, UTI, she has cardiomyopathy with an EF of 15%. Family  came to visit in the afternoon and made a decision for comfort care, hospice Nurse Casandra Blake met with family and she was placed on hospice care. The patient/family chose comfort measures with the support of Hospice. IV fluids, artificial nutrition and hydration and antibiotics have been stopped. HOSPICE DIAGNOSES   Active Symptoms:  1. Altered mental status  2. Weakness  3. Debility  4. Shortness of breath     PLAN   1. Routine level of hospice care  2. Focus on comfort  3. Lorazepam and dilaudid as needed  4.  and SW to support family needs  5.  Disposition: home if possible    Prognosis estimated based on 01/17/17 clinical assessment is: [x] Few to Many Hours  [] Few to Many Days    [] Few to Many Weeks    [] Few to Many Months    Communicated plan of care with: Hospice Case Manager; Hospice IDT; Care Team     GOALS OF CARE     Resuscitation Status: DNR   Durable DNR: [x] Yes [] No    Advance Care Planning 1/17/2017   Patient's Healthcare Decision Maker is: Named in scanned ACP document   Primary Decision Maker Name Travis Norris   Primary Decision Maker Phone Number [phone]   Primary Decision Maker Relationship to Patient Adult child   Secondary Decision Maker Name Lamont Bird    Secondary Decision Maker Phone Number -   Secondary Decision Maker Relationship to Patient Adult child   Confirm Advance Directive Yes, not on file   Does the patient have other document types Power of         HISTORY     History obtained from: chart, family    CHIEF COMPLAINT: altered mental status  The patient is:   [] Verbal  [x] Nonverbal  [] Unresponsive    HPI/SUBJECTIVE:  67year old female who underwent a CABG in October of 2016 and had a CVA. Pt has not been improving since that time and has progressively deteriorated. Admitted tot  hospital for sepsis/UTI and family decided she has had enough life sustaining measures and wanted to focus on her comfort.        REVIEW OF SYSTEMS     The following systems were: [] reviewed  [x] unable to be reviewed    Positive ROS include:  Constitutional:  Ears/nose/mouth/throat:  Respiratory:  Gastrointestinal:  Musculoskeletal:  Neurologic:  Psychiatric:  Endocrine:     Adult Non-Verbal Pain Assessment Score: 2    Face  [] 0   No particular expression or smile  [x] 1   Occasional grimace, tearing, frowning, wrinkled forehead  [] 2   Frequent grimace, tearing, frowning, wrinkled forehead    Activity (movement)  [] 0   Lying quietly, normal position  [x] 1   Seeking attention through movement or slow, cautious movement  [] 2   Restless, excessive activity and/or withdrawal reflexes    Guarding  [x] 0   Lying quietly, no positioning of hands over areas of body  [] 1   Splinting areas of the body, tense  [] 2   Rigid, stiff    Physiology (vital signs)  [x] 0   Stable vital signs  [] 1   Change in any of the following: SBP > 20mm Hg; HR > 20/minute  [] 2   Change in any of the following: SBP > 30mm Hg; HR > 25/minute    Respiratory  [x] 0   Baseline RR/SpO2, compliant with ventilator  [] 1   RR > 10 above baseline, or 5% drop SpO2, mild asynchrony with ventilator  [] 2   RR > 20 above baseline, or 10% drop SpO2, asynchrony with ventilator     FUNCTIONAL ASSESSMENT     Palliative Performance Scale (PPS): 30%     PSYCHOSOCIAL/SPIRITUAL ASSESSMENT     Active Problems:    Sepsis ([facility] [address] (1/13/2017)      Past Medical History   Diagnosis Date    Chronic pain      hips and legs    Coronary artery disease involving native coronary artery of native heart with unstable angina pectoris ([facility] [address] 11/14/2016    Diabetes ([facility] [address]     GERD (gastroesophageal reflux disease)     Hypertension     On intra-aortic balloon pump assist 11/14/2016     Placed in cath lab; left leg    Other ill-defined conditions(799.89)      increased cholesterol    Other ill-defined conditions(799.89)      diverticulosis    Psychiatric disorder      anxiety    Stroke ([facility] [address]     Thyroid disease     Unspecified adverse effect of anesthesia      woke up early at end of bunionectomy      Past Surgical History   Procedure Laterality Date    Hx lap cholecystectomy      Hx urological       cytocele and rectocele repair    Hx heent       dental surgery - gum graft    Hx other surgical       colonoscopy x 2 - no polyps per pt    Hx orthopaedic       bilateral carpal tunnel    Hx orthopaedic       bilateral bunionectomy    Hx orthopaedic       partial hip replacement      Social History   Substance Use Topics    Smoking status: Never Smoker    Smokeless tobacco: Not on file    Alcohol use No     Family History   Problem Relation Age of Onset    [facility] Disease Mother    [facility] Mother     Breast Cancer Maternal Aunt       Allergies   Allergen Reactions    Benadryl [Diphenhydramine Hcl] Rash    Statins-Hmg-Coa Reductase Inhibitors Other (comments)     Elevated LFTS post CABG while taking med.       Sulfa (Sulfonamide Antibiotics) Rash      Current Facility-Administered Medications   Medication Dose Route Frequency    saline peripheral flush soln 5 mL  5 mL InterCATHeter PRN    nystatin (MYCOSTATIN) 100,000 unit/gram cream   Topical TID    LORazepam (ATIVAN) injection 0.5 mg  0.5 mg IntraVENous Q15MIN PRN    bisacodyl (DULCOLAX) suppository 10 mg  10 mg Rectal DAILY PRN    HYDROmorphone (PF) (DILAUDID) injection 0.5 mg  0.5 mg IntraVENous Q15MIN PRN    acetaminophen (TYLENOL) suppository 650 mg  650 mg Rectal Q4H PRN    atropine 1 % ophthalmic solution 3 Drop  3 Drop SubLINGual Q4H PRN        PHYSICAL EXAM     Wt Readings from Last 3 Encounters:   01/16/17 126 lb 12.8 oz (57.5 kg)   01/12/17 107 lb (48.5 kg)   01/12/17 120 lb 4.8 oz (54.6 kg)       Visit Vitals    BP (!) 80/49 (BP 1 Location: Right arm, BP Patient Position: At rest)    Pulse 88    Temp 96.7 °F (35.9 °C)    Resp 10    SpO2 96%    Breastfeeding No       Supplemental O2  [x] Yes  [] NO  Last bowel movement:     Currently this patient has:  [x] Peripheral IV [] PICC  [] PORT [] ICD    [] Boles Catheter [] NG Tube   [] PEG Tube    [] Rectal Tube [] Drain  [] Other:     Constitutional: pt is minimally responsive  Eyes: cl  ENMT: cl  Cardiovascular: HRRR  Respiratory: lungs cl, SOB  Gastrointestinal: SNT, BS+  Musculoskeletal: weakness noted  Skin: warm, dry  Neurologic: NA  Psychiatric: calm  Other:    Pertinent Lab and or Imaging Tests:  Lab Results   Component Value Date/Time    Sodium 139 01/16/2017 03:58 AM    Potassium 3.2 01/16/2017 03:58 AM    Chloride 106 01/16/2017 03:58 AM    CO2 22 01/16/2017 03:58 AM    Anion gap 11 01/16/2017 03:58 AM    Glucose 90 01/16/2017 03:58 AM    BUN 51 01/16/2017 03:58 AM Creatinine 1.21 01/16/2017 03:58 AM    BUN/Creatinine ratio 42 01/16/2017 03:58 AM    GFR est AA 52 01/16/2017 03:58 AM    GFR est non-AA 43 01/16/2017 03:58 AM    Calcium 8.0 01/16/2017 03:58 AM     Lab Results   Component Value Date/Time    Protein, total 6.1 01/15/2017 04:23 AM    Albumin 2.5 01/15/2017 04:23 AM           Total time: 39  Counseling / coordination time: 35  > 50% counseling / coordination?: y                         Thank you for including Palliative Medicine in this patient's care.   Fredi Moses NP                                            HISTORY:     Active Problems:    Sepsis ([facility] [address] (1/13/2017)      Past Medical History   Diagnosis Date    Chronic pain      hips and legs    Coronary artery disease involving native coronary artery of native heart with unstable angina pectoris ([address] 11/14/2016    Diabetes ([facility] [address]     GERD (gastroesophageal reflux disease)     Hypertension     On intra-aortic balloon pump assist 11/14/2016     Placed in cath lab; left leg    Other ill-defined conditions(799.89)      increased cholesterol    Other ill-defined conditions(799.89)      diverticulosis    Psychiatric disorder      anxiety    Stroke ([address]     Thyroid disease     Unspecified adverse effect of anesthesia      woke up early at end of bunionectomy      Past Surgical History   Procedure Laterality Date    Hx lap cholecystectomy      Hx urological       cytocele and rectocele repair    Hx heent       dental surgery - gum graft    Hx other surgical       colonoscopy x 2 - no polyps per pt    Hx orthopaedic       bilateral carpal tunnel    Hx orthopaedic       bilateral bunionectomy    Hx orthopaedic       partial hip replacement      Family History   Problem Relation Age of Onset    Heart Disease Mother     COPD Mother     Breast Cancer Maternal Aunt       Social History   Substance Use Topics    Smoking status: Never Smoker    Smokeless tobacco: Not on file    Alcohol use No Allergies   Allergen Reactions    Benadryl [Diphenhydramine Hcl] Rash    Statins-Hmg-Coa Reductase Inhibitors Other (comments)     Elevated LFTS post CABG while taking med.  Sulfa (Sulfonamide Antibiotics) Rash      Current Facility-Administered Medications   Medication Dose Route Frequency    saline peripheral flush soln 5 mL  5 mL InterCATHeter PRN    nystatin (MYCOSTATIN) 100,000 unit/gram cream   Topical TID    LORazepam (ATIVAN) injection 0.5 mg  0.5 mg IntraVENous Q15MIN PRN    bisacodyl (DULCOLAX) suppository 10 mg  10 mg Rectal DAILY PRN    HYDROmorphone (PF) (DILAUDID) injection 0.5 mg  0.5 mg IntraVENous Q15MIN PRN    acetaminophen (TYLENOL) suppository 650 mg  650 mg Rectal Q4H PRN    atropine 1 % ophthalmic solution 3 Drop  3 Drop SubLINGual Q4H PRN          LAB AND IMAGING FINDINGS:     Lab Results   Component Value Date/Time    WBC 18.8 01/16/2017 03:58 AM    HGB 10.4 01/16/2017 03:58 AM    PLATELET 116 89/27/0449 03:58 AM     Lab Results   Component Value Date/Time    Sodium 139 01/16/2017 03:58 AM    Potassium 3.2 01/16/2017 03:58 AM    Chloride 106 01/16/2017 03:58 AM    CO2 22 01/16/2017 03:58 AM    BUN 51 01/16/2017 03:58 AM    Creatinine 1.21 01/16/2017 03:58 AM    Calcium 8.0 01/16/2017 03:58 AM    Magnesium 1.9 01/16/2017 03:58 AM    Phosphorus 3.5 01/16/2017 03:58 AM      Lab Results   Component Value Date/Time    AST 29 01/15/2017 04:23 AM    Alk.  phosphatase 102 01/15/2017 04:23 AM    Protein, total 6.1 01/15/2017 04:23 AM    Albumin 2.5 01/15/2017 04:23 AM    Globulin 3.6 01/15/2017 04:23 AM     Lab Results   Component Value Date/Time    INR 1.2 11/26/2016 04:30 AM    Prothrombin time 12.3 11/26/2016 04:30 AM    aPTT 24.2 11/26/2016 04:30 AM      No results found for: IRON, FE, TIBC, IBCT, PSAT, FERR   No results found for: PH, PCO2, PO2  No components found for: Ck Point   Lab Results   Component Value Date/Time    CK 48 11/11/2016 10:13 AM    CK - MB 1.0 11/11/2016 10:13 AM

## 2017-01-17 NOTE — HOSPICE
PRN visit. Notified by unit nurse that IV infiltrated. Call to James Salcedo NP and received new orders to include d/c IV medications. Start Lorazepam 0.5 mg SL Q4H scheduled and Q1H PRN and Morphine 5 mg SL Q4H scheduled and Q1H PRN. Patient resting comfortably. Breathing even and unlabored, shallow. EOL education provided to son and daughter. Active listening and emotional support provided. Education on changes in medications. Daughter expresses concern that patient had increased agitation and hallucinations after receiving morphine in the past but states that may have been due to many reasons and just coincided with administration of medication. Advised if patient does not respond well to morphine (has any evidence of anxiety, hallucinations) following administration then we could switch to alternative such as Roxicodone or SQ dilaudid.

## 2017-01-18 NOTE — PROGRESS NOTES
Bedside and Verbal shift change report given to Sheree RN (oncoming nurse) by Kristina Godoy RN (offgoing nurse). Report included the following information SBAR, Kardex, Procedure Summary, Intake/Output, MAR, Accordion and Recent Results.

## 2017-01-18 NOTE — ROUTINE PROCESS
Bedside and Verbal shift change report given to Jackie Ramirez (oncoming nurse) by Melissa YATES RN(offgoing nurse). Report included the following information SBAR and Kardex.

## 2017-01-18 NOTE — PROGRESS NOTES
Hospice social worker, Mp Stephens, and myself met with Beryle Levee, the patient's son. Beryle Levee was seated next to his mother. Beryle Levee and his sister have been maintaining a guadalupe. Beryle Levee seemed tired and sad. For instance, Beryle Levee became tearful several times during the visit. Beryle Levee shared that he has spent most of his life living with his mother. Beryle Levee also commented that her potential death would leave a major void in his life. I attempted to help Beryle Levee to identify his strengths through the use of life review and humor. Beryle Levee mentioned that he did have one friend whom he trusted. Furthermore, Beryle Levee also regularly sees a therapist to treat his depression. I reframed Alex's presence as an example of \"being a good son. \" I affirmed Beryle Levee for his willingness to remain present. I also reminded Beryle Levee of his various other roles including brother and uncle. I encouraged Beryle Levee to practice self-care. Beryle Levee shared that his pet Beagle was a source of narcisa. I encouraged Beryle Levee to spend time with his dog as a way of coping. I thanked Beryle Levee for sharing. I also encouraged Beryle Levee to contact hospice or the hospital staff if he needed additional emotional support.

## 2017-01-18 NOTE — HOSPICE
[facility] Note: This hospitalsW and Trae Li, Hospice Chaplain met with patient and her son, Don to meet Don, introduce ourselves as part of the hospice team and provide support. Don was tearful, shared openly that he is going to have a \"hard time without my mother as I have lived with her all my life\". He is 45years old. He has few other supports but does have a therapist that he sees regularly. Don also has a dog that he cares for and he is realistic regarding thinking about living without his mother. Don shared that his mother is an artist and that \"she always looked out for me\". He and his sister haven't been very close emotionally but they seem to have reconnected recently over the past few months during patient's illness. Active listening provided and Don informed about bereavement services which he noted \"I will probably take advantage of\". Don has one \"good friend' but he hasn't been in touch with him recently due to patient's illness. We continue to be available to patient and this family for emotional support. Don was planning to step outside for some fresh air and plans to go home this evening to let his dog out. Encouraged both and reassured him that patient is aware and has been aware of his being by her side.

## 2017-01-19 NOTE — PROGRESS NOTES
Responded to notification of patient's death. Offered care to patient's son, Mason Mckeon at bedside. Mason Mckeon indicated that he has never  and has lived with his mother all of his 45 years. The grief is difficult for him to process at the moment. He is expecting his sister, Anita Mallory to arrive and grieve with him. Information about [facility] provided.   Chaplain Leyva MDiv, MS, [facility]  [phone])

## 2017-01-19 NOTE — CERTIFICATE OF TERMINAL ILLNESS
Hospice Physician Admission Narrative   (Certification of Terminal Illness)    [facility]  Good Help to Those in Need  [phone]    Behzad Ochoa 61 terminal diagnosis:     Sepsis, due to unspecified organism ([address] (A41.9)  Other Hospice diagnoses:     Hemiplegia and hemiparesis following cerebral infarction affecting left non-dominant side (HCC) (S60920)     Urinary tract infection, site not specified (N39.0)     Heart failure, unspecified (HCC) (I50.9)     Unspecified essential hypertension (I10)     Type 2 diabetes mellitus without complication, unspecified long term insulin use status (HCC) (E11.9)     Coronary artery disease without angina pectoris, unspecified vessel or lesion type, unspecified whether native or transplanted heart (I25.10)     Encounter for hospice care (Z51.5)    Benefit Period 1  Start Date: 1/16/2017  End Date: 4/15/2017       HOSPICE NARRATIVE COMPOSED BY PHYSICIAN   Rationale for a prognosis of life expectancy of 6 months or less if the disease follows its normal course:    See Fernando is a 67 y.o. who was admitted to [facility]. The patient's principle diagnosis of sepsis from the urinary tract has resulted in altered mentation, restlessness, generalized pain. Functionally, the patient's Palliative Performance Scale has declined over a period of 2 months and is estimated at 20. Objective information that support this patients limited prognosis includes: WBC 18.8; ; Lactic Acid 3.3; Pro-BMP 9938; Urine Culture results:VANCOMYCIN RESISTANT ENTEROCOCCUS FAECIUM.  The patient/family chose comfort measures with the support of Hospice.     Attestation: I confirm that I composed this narrative as the physician and is based on my review of the patient's medical record and/or examination of the patient. ____________________________________________________________________

## 2017-01-19 NOTE — PROGRESS NOTES
Bedside and Verbal shift change report given to Sheree RN (oncoming nurse) by Flores Griffin RN (offgoing nurse). Report included the following information SBAR, Kardex, Procedure Summary, Intake/Output, MAR, Accordion and Recent Results.

## 2017-01-19 NOTE — PROGRESS NOTES
21 - spoke with son, Debra Abrams, and family has left the hospital and is ready for us to call the  home. 1100- Postmortom care performed. Valarie's  home notified that patient is ready for .

## 2017-01-19 NOTE — H&P
[facility]  [phone]    Patient Name: Lavinia Rodgers  YOB: 1940    Date of Provider Hospice Visit: 01/19/17    Level of Care:   [] General Inpatient (GIP)    [x] Routine   [] Respite    Location of Care:  [] [facility] [x] [facility] [] [facility] [] [facility] [] Hospice [facility]  [] Home [] Other:      Date of Original Hospice Admission: 1-16-17  Hospice Attending: Janneth Hernandes    Principle Hospice Diagnosis:  CVA, sepsis  Diagnoses RELATED to the terminal prognosis: AMS, EF15%, VRE UTI with sepsis        HOSPICE NARRATIVE COMPOSED BY PHYSICIAN   Rationale for a prognosis of life expectancy of 6 months or less if the disease follows its normal course:       Ms. Ginna Harris is a 67 y.o. with h/o cad, chf, dm, cva who presents with confusion. History is limited as pt is currently minimally verbal, SAH reports able to converse at baseline. Pt was recently admitted twice in at [facility]. First hospital stay she underwent CABG which was complicated by CVA with residual hemiparesis and cardiogenic shock. She was discharged and was re-admitted for acute heart failure and was diuresed. Met with daughter and we discussed goals of care and possible hospice Pt is now septic, UTI, she has cardiomyopathy with an EF of 15%. Family  came to visit in the afternoon and made a decision for comfort care, hospice Nurse Allison Moss met with family and she was placed on hospice care. The patient/family chose comfort measures with the support of Hospice. IV fluids, artificial nutrition and hydration and antibiotics have been stopped. HOSPICE DIAGNOSES   Active Symptoms:  1. Altered mental status  2. Weakness  3. Debility  4. Shortness of breath     PLAN   1. Routine level of hospice care  2. Focus on comfort  3. Lorazepam and dilaudid as needed  4.  and SW to support family needs  5.  Disposition: home if possible    Prognosis estimated based on 01/19/17 clinical assessment is: [x] Few to Many Hours  [] Few to Many Days    [] Few to Many Weeks    [] Few to Many Months    Communicated plan of care with: Hospice Case Manager; Hospice IDT; Care Team     GOALS OF CARE     Resuscitation Status: DNR   Durable DNR: [x] Yes [] No    Advance Care Planning 1/17/2017   Patient's Healthcare Decision Maker is: Named in scanned ACP document   Primary Decision Maker Name Brandt Pederson   Primary Decision Maker Phone Number [phone]   Primary Decision Maker Relationship to Patient Adult child   Secondary Decision Maker Name Xenia Jones    Secondary Decision Maker Phone Number -   Secondary Decision Maker Relationship to Patient Adult child   Confirm Advance Directive Yes, not on file   Does the patient have other document types Power of         HISTORY     History obtained from: chart, family    CHIEF COMPLAINT: altered mental status  The patient is:   [] Verbal  [x] Nonverbal  [] Unresponsive    HPI/SUBJECTIVE:  67year old female who underwent a CABG in October of 2016 and had a CVA. Pt has not been improving since that time and has progressively deteriorated. Admitted tot  hospital for sepsis/UTI and family decided she has had enough life sustaining measures and wanted to focus on her comfort.        REVIEW OF SYSTEMS     The following systems were: [] reviewed  [x] unable to be reviewed    Positive ROS include:  Constitutional:  Ears/nose/mouth/throat:  Respiratory:  Gastrointestinal:  Musculoskeletal:  Neurologic:  Psychiatric:  Endocrine:     Adult Non-Verbal Pain Assessment Score: 2    Face  [] 0   No particular expression or smile  [x] 1   Occasional grimace, tearing, frowning, wrinkled forehead  [] 2   Frequent grimace, tearing, frowning, wrinkled forehead    Activity (movement)  [] 0   Lying quietly, normal position  [x] 1   Seeking attention through movement or slow, cautious movement  [] 2   Restless, excessive activity and/or withdrawal reflexes    Guarding  [x] 0   Lying quietly, no positioning of hands over areas of body  [] 1   Splinting areas of the body, tense  [] 2   Rigid, stiff    Physiology (vital signs)  [x] 0   Stable vital signs  [] 1   Change in any of the following: SBP > 20mm Hg; HR > 20/minute  [] 2   Change in any of the following: SBP > 30mm Hg; HR > 25/minute    Respiratory  [x] 0   Baseline RR/SpO2, compliant with ventilator  [] 1   RR > 10 above baseline, or 5% drop SpO2, mild asynchrony with ventilator  [] 2   RR > 20 above baseline, or 10% drop SpO2, asynchrony with ventilator     FUNCTIONAL ASSESSMENT     Palliative Performance Scale (PPS): 30%     PSYCHOSOCIAL/SPIRITUAL ASSESSMENT     Active Problems:    Sepsis ([facility] [address] (1/13/2017)      Past Medical History   Diagnosis Date    Chronic pain      hips and legs    Coronary artery disease involving native coronary artery of native heart with unstable angina pectoris ([facility] [address] 11/14/2016    Diabetes ([facility] [address]     GERD (gastroesophageal reflux disease)     Hypertension     On intra-aortic balloon pump assist 11/14/2016     Placed in cath lab; left leg    Other ill-defined conditions(799.89)      increased cholesterol    Other ill-defined conditions(799.89)      diverticulosis    Psychiatric disorder      anxiety    Stroke ([address]     Thyroid disease     Unspecified adverse effect of anesthesia      woke up early at end of bunionectomy      Past Surgical History   Procedure Laterality Date    Hx lap cholecystectomy      Hx urological       cytocele and rectocele repair    Hx heent       dental surgery - gum graft    Hx other surgical       colonoscopy x 2 - no polyps per pt    Hx orthopaedic       bilateral carpal tunnel    Hx orthopaedic       bilateral bunionectomy    Hx orthopaedic       partial hip replacement      Social History   Substance Use Topics    Smoking status: Never Smoker    Smokeless tobacco: Not on file    Alcohol use No     Family History   Problem Relation Age of Onset    Heart Disease Mother    [facility] COPD Mother     Breast Cancer Maternal Aunt       Allergies   Allergen Reactions    Benadryl [Diphenhydramine Hcl] Rash    Statins-Hmg-Coa Reductase Inhibitors Other (comments)     Elevated LFTS post CABG while taking med.  Sulfa (Sulfonamide Antibiotics) Rash      No current facility-administered medications for this encounter. Current Outpatient Prescriptions   Medication Sig    insulin lispro (HUMALOG) 100 unit/mL injection by SubCUTAneous route four (4) times daily. Indications: Given as sliding scale.  omeprazole (PRILOSEC) 20 mg capsule Take 20 mg by mouth Daily (before breakfast).  acetaminophen (TYLENOL) 325 mg tablet Take 650 mg by mouth every six (6) hours as needed for Pain.  spironolactone (ALDACTONE) 50 mg tablet Take 1 Tab by mouth daily. Indications: Chronic Heart Failure    potassium chloride SR (K-TAB) 20 mEq tablet Take 2 Tabs by mouth three (3) times daily. Indications: HYPOKALEMIA PREVENTION    nystatin (MYCOSTATIN) 100,000 unit/gram ointment Apply to rash TID.  midodrine (PROAMITINE) 10 mg tablet Take 1 Tab by mouth three (3) times daily for 30 days. Indications: Symptomatic Orthostatic Hypotension    metOLazone (ZAROXOLYN) 5 mg tablet Take 1 Tab by mouth two (2) times a week. Indications: PULMONARY EDEMA DUE TO CHRONIC HEART FAILURE    methylphenidate (RITALIN) 10 mg tablet Take 1 Tab (10 mg total) by mouth dailyIndications: Depression. Max Daily Amount: 10 mg    insulin NPH (NOVOLIN N, HUMULIN N) 100 unit/mL injection 22 Units by SubCUTAneous route Before breakfast and dinner. Indications: type 2 diabetes mellitus    bumetanide (BUMEX) 2 mg tablet Take 1 Tab by mouth two (2) times a day. Indications: PULMONARY EDEMA DUE TO CHRONIC HEART FAILURE    senna-docusate (PERICOLACE) 8.6-50 mg per tablet Take 1 Tab by mouth two (2) times a day.  enoxaparin (LOVENOX) 40 mg/0.4 mL 0.4 mL by SubCUTAneous route every twenty-four (24) hours.  For DVT prophylaxis    oxyCODONE-acetaminophen (PERCOCET) 5-325 mg per tablet Take 1-2 Tabs by mouth every four (4) hours as needed. Max Daily Amount: 12 Tabs.  multivitamin (ONE A DAY) tablet Take 1 Tab by mouth daily.  fluticasone (FLONASE) 50 mcg/actuation nasal spray 2 Sprays by Both Nostrils route daily.  escitalopram (LEXAPRO) 20 mg tablet Take 20 mg by mouth daily.  simvastatin (ZOCOR) 20 mg tablet Take 20 mg by mouth nightly.  levothyroxine (SYNTHROID) 50 mcg tablet Take 50 mcg by mouth Daily (before breakfast).  aspirin 81 mg tablet Take 81 mg by mouth daily.         PHYSICAL EXAM     Wt Readings from Last 3 Encounters:   01/16/17 126 lb 12.8 oz (57.5 kg)   01/12/17 107 lb (48.5 kg)   01/12/17 120 lb 4.8 oz (54.6 kg)       Visit Vitals    BP (!) 58/42 (BP 1 Location: Left arm, BP Patient Position: At rest)    Pulse 100    Temp 98.7 °F (37.1 °C)    Resp 20    SpO2 94%    Breastfeeding No       Supplemental O2  [x] Yes  [] NO  Last bowel movement:     Currently this patient has:  [x] Peripheral IV [] PICC  [] PORT [] ICD    [] Boles Catheter [] NG Tube   [] PEG Tube    [] Rectal Tube [] Drain  [] Other:     Constitutional: pt is minimally responsive  Eyes: cl  ENMT: cl  Cardiovascular: HRRR  Respiratory: lungs cl, SOB  Gastrointestinal: SNT, BS+  Musculoskeletal: weakness noted  Skin: warm, dry  Neurologic: NA  Psychiatric: calm  Other:    Pertinent Lab and or Imaging Tests:  Lab Results   Component Value Date/Time    Sodium 139 01/16/2017 03:58 AM    Potassium 3.2 01/16/2017 03:58 AM    Chloride 106 01/16/2017 03:58 AM    CO2 22 01/16/2017 03:58 AM    Anion gap 11 01/16/2017 03:58 AM    Glucose 90 01/16/2017 03:58 AM    BUN 51 01/16/2017 03:58 AM    Creatinine 1.21 01/16/2017 03:58 AM    BUN/Creatinine ratio 42 01/16/2017 03:58 AM    GFR est AA 52 01/16/2017 03:58 AM    GFR est non-AA 43 01/16/2017 03:58 AM    Calcium 8.0 01/16/2017 03:58 AM     Lab Results   Component Value Date/Time    Protein, total 6.1 01/15/2017 04:23 AM    Albumin 2.5 01/15/2017 04:23 AM           Total time: 45  Counseling / coordination time: 35  > 50% counseling / coordination?: y                         Thank you for including Palliative Medicine in this patient's care. Elisa Coleman MD                                            HISTORY:     Active Problems:    Sepsis [facility]) (1/13/2017)      Past Medical History   Diagnosis Date    Chronic pain      hips and legs    Coronary artery disease involving native coronary artery of native heart with unstable angina pectoris ([facility] [address] 11/14/2016    Diabetes ([facility] [address]     GERD (gastroesophageal reflux disease)     Hypertension     On intra-aortic balloon pump assist 11/14/2016     Placed in cath lab; left leg    Other ill-defined conditions(799.89)      increased cholesterol    Other ill-defined conditions(799.89)      diverticulosis    Psychiatric disorder      anxiety    Stroke ([facility] [address]     Thyroid disease     Unspecified adverse effect of anesthesia      woke up early at end of bunionectomy      Past Surgical History   Procedure Laterality Date    Hx lap cholecystectomy      Hx urological       cytocele and rectocele repair    Hx heent       dental surgery - gum graft    Hx other surgical       colonoscopy x 2 - no polyps per pt    Hx orthopaedic       bilateral carpal tunnel    Hx orthopaedic       bilateral bunionectomy    Hx orthopaedic       partial hip replacement      Family History   Problem Relation Age of Onset    Heart Disease Mother     COPD Mother     Breast Cancer Maternal Aunt       Social History   Substance Use Topics    Smoking status: Never Smoker    Smokeless tobacco: Not on file    Alcohol use No     Allergies   Allergen Reactions    Benadryl [Diphenhydramine Hcl] Rash    Statins-Hmg-Coa Reductase Inhibitors Other (comments)     Elevated LFTS post CABG while taking med.       Sulfa (Sulfonamide Antibiotics) Rash      No current facility-administered medications for this encounter. Current Outpatient Prescriptions   Medication Sig    insulin lispro (HUMALOG) 100 unit/mL injection by SubCUTAneous route four (4) times daily. Indications: Given as sliding scale.  omeprazole (PRILOSEC) 20 mg capsule Take 20 mg by mouth Daily (before breakfast).  acetaminophen (TYLENOL) 325 mg tablet Take 650 mg by mouth every six (6) hours as needed for Pain.  spironolactone (ALDACTONE) 50 mg tablet Take 1 Tab by mouth daily. Indications: Chronic Heart Failure    potassium chloride SR (K-TAB) 20 mEq tablet Take 2 Tabs by mouth three (3) times daily. Indications: HYPOKALEMIA PREVENTION    nystatin (MYCOSTATIN) 100,000 unit/gram ointment Apply to rash TID.  midodrine (PROAMITINE) 10 mg tablet Take 1 Tab by mouth three (3) times daily for 30 days. Indications: Symptomatic Orthostatic Hypotension    metOLazone (ZAROXOLYN) 5 mg tablet Take 1 Tab by mouth two (2) times a week. Indications: PULMONARY EDEMA DUE TO CHRONIC HEART FAILURE    methylphenidate (RITALIN) 10 mg tablet Take 1 Tab (10 mg total) by mouth dailyIndications: Depression. Max Daily Amount: 10 mg    insulin NPH (NOVOLIN N, HUMULIN N) 100 unit/mL injection 22 Units by SubCUTAneous route Before breakfast and dinner. Indications: type 2 diabetes mellitus    bumetanide (BUMEX) 2 mg tablet Take 1 Tab by mouth two (2) times a day. Indications: PULMONARY EDEMA DUE TO CHRONIC HEART FAILURE    senna-docusate (PERICOLACE) 8.6-50 mg per tablet Take 1 Tab by mouth two (2) times a day.  enoxaparin (LOVENOX) 40 mg/0.4 mL 0.4 mL by SubCUTAneous route every twenty-four (24) hours. For DVT prophylaxis    oxyCODONE-acetaminophen (PERCOCET) 5-325 mg per tablet Take 1-2 Tabs by mouth every four (4) hours as needed. Max Daily Amount: 12 Tabs.  multivitamin (ONE A DAY) tablet Take 1 Tab by mouth daily.  fluticasone (FLONASE) 50 mcg/actuation nasal spray 2 Sprays by Both Nostrils route daily.     escitalopram (LEXAPRO) 20 mg tablet Take 20 mg by mouth daily.  simvastatin (ZOCOR) 20 mg tablet Take 20 mg by mouth nightly.  levothyroxine (SYNTHROID) 50 mcg tablet Take 50 mcg by mouth Daily (before breakfast).  aspirin 81 mg tablet Take 81 mg by mouth daily. LAB AND IMAGING FINDINGS:     Lab Results   Component Value Date/Time    WBC 18.8 01/16/2017 03:58 AM    HGB 10.4 01/16/2017 03:58 AM    PLATELET 763 27/00/4121 03:58 AM     Lab Results   Component Value Date/Time    Sodium 139 01/16/2017 03:58 AM    Potassium 3.2 01/16/2017 03:58 AM    Chloride 106 01/16/2017 03:58 AM    CO2 22 01/16/2017 03:58 AM    BUN 51 01/16/2017 03:58 AM    Creatinine 1.21 01/16/2017 03:58 AM    Calcium 8.0 01/16/2017 03:58 AM    Magnesium 1.9 01/16/2017 03:58 AM    Phosphorus 3.5 01/16/2017 03:58 AM      Lab Results   Component Value Date/Time    AST 29 01/15/2017 04:23 AM    Alk.  phosphatase 102 01/15/2017 04:23 AM    Protein, total 6.1 01/15/2017 04:23 AM    Albumin 2.5 01/15/2017 04:23 AM    Globulin 3.6 01/15/2017 04:23 AM     Lab Results   Component Value Date/Time    INR 1.2 11/26/2016 04:30 AM    Prothrombin time 12.3 11/26/2016 04:30 AM    aPTT 24.2 11/26/2016 04:30 AM      No results found for: IRON, FE, TIBC, IBCT, PSAT, FERR   No results found for: PH, PCO2, PO2  No components found for: [place]   Lab Results   Component Value Date/Time    CK 48 11/11/2016 10:13 AM    CK - MB 1.0 11/11/2016 10:13 AM          Patient was seen by Tressa Jeans and this served as H&P

## 2017-01-19 NOTE — PROGRESS NOTES
Pt is not breathing and no apical pulse noted. Informed MD and son is on the way. Death report completed. Family in the room and still report handed to am nurse.

## 2017-01-19 NOTE — PROGRESS NOTES
Patient unresponsive. Pupils fixed. No spontaneous respirations. No heart sounds auscultated.     Death pronounced at 07:15

## 2017-01-20 VITALS
DIASTOLIC BLOOD PRESSURE: 49 MMHG | OXYGEN SATURATION: 96 % | SYSTOLIC BLOOD PRESSURE: 80 MMHG | HEART RATE: 88 BPM | RESPIRATION RATE: 10 BRPM | TEMPERATURE: 96.7 F

## 2017-01-20 NOTE — HOSPICE
[facility]  [phone]    Routine Nursing Note   Patient Name: Harpal Koenig  YOB: 1940  Age: 67 y.o. Date of Visit: 1/18/17  Facility of Care: [facility]  Patient Room: 522/01     Hospice Attending: No att. providers found  Hospice Diagnosis: Sepsis  Sepsis ([address]    Level of Care: Routine    ASSESSMENT, PLAN AND INTERVENTIONS     1. Patient admitted to Routine Level of Care  2. Continue scheduled morphine and lorazepam SL. Continue PRN symptom relief medications. 3. Patient unresponsive, respirations shallow, even and unlabored. No signs or symptoms of     Spiritual Interventions: [address] [place] [address] visit today with son Crys Contreras today    Psych/ Social/ Emotional Interventions:  visit with son Crys Contreras today    Care Coordination Needs: IDT communication    Care plan and New Orders discussed / approved with Shilpi Reyes MD.    DISCHARGE PLANNING     1. Discharge Plan: patient on routine level of care in the hospital, unlikely to discharge home secondary to imminence, if stabilizes or has change in status plan is for discharge to son's home under hospice routine level of care. 2. Patient/Family teaching: EOL education, emotional support  3. Response to patient/family teaching: Son verbalized understanding    ASSESSMENT    KARNOFSKY: 10    Prognosis estimated based on 01/20/17 clinical assessment is:   [] Few to Many Hours  [x] Hours to Days   [] Few to Many Days   [] Days to Weeks   [] Few to Many Weeks   [] Weeks to Months   [] Few to Many Months    Quality Measure: Patient self-reports:  [] Yes    [x] No    ESAS:   Time of Assessment: 2:30 PM  Pain (1-10): 0  Fatigue (1-10):   Shortness of breath (1-10): 0  Nausea (1-10): 0  Appetite (1-10):    Anxiety: (1-10): 0  Depression: (1-10):   Well-being: (1-10):   Constipation: _ Yes  x No  LAST BM: 1/18/17    CLINICAL INFORMATION   T 99.5  HR 99  Unable to obtain BP  RR 14  SPO2 98% on 2 LPM via NC      Currently this patient has:  [x] Supplemental O2   [] IV    [] PICC      [] PORT   [] NG Tube    [] PEG Tube   [] Ostomy     [x] Boles draining concentrated yellow urine  [] Other:     SIGNS/PHYSICAL FINDINGS     Skin (including wound):  [] Warm, dry, supple, intact and color normal for race  [x] Warm   [x] Dry   [] Cool     [] Clammy       [] Diaphoretic    Turgor   [] Normal   [x] Decreased  Color:   [] Pink   [] Pale   [] Cyanotic   [x] Erythema   [] Jaundice   [] Normal for Race  []  Wounds:    Neuro:  [] Lethargy  [] Restlessness / agitation  [] Confusion / delirium  [] Hallucinations  [] Responds to maximal stimulation  [x] Unresponsive  [] Seizures     Cardiac:  [] Dyspnea on Exertion  [] JVD  [] Murmur  [] Palpitations  [x] Hypotension  [] Hypertension  [] Tachycardia  [] Bradycardia  [] Irregular HR  [] Pulses Decreased  [] Pulses Absent  [] Edema:       (Location, Grade and Pitting)  [] Mottling:      (Location)    Respiratory:  Breath sounds:    [x] Diminished   [] Wheeze   [] Rhonchi   [] Rales   [x] Even and unlabored  [] Labored:            [] Cough   [] Non Productive   [] Productive    [] Description:           [] Deep suctioned   [x] O2 at 2 LPM  [] High flow oxygen greater than 10 LPM  [] Bi-Pap    GI  [x] Abdomen soft, non tender  [] Ascites  [] Nausea  [] Vomiting  [x] Incontinent of bowels  [x] Bowel sounds hypoactive  [] Diarrhea  [] Constipation (see above including last bowel movement)  [] Checked for impaction  [x] Last BM 1/18/17    Nutrition  Diet: NPO  Appetite:   [] Good   [] Fair   [] Poor   [] Tube Feeding       [] Voiding  [] Incontinent   [x] Boles    Musculoskeletal  [] Balance/Troy Unsteady   [] Weak   Strength:    [] Normal    [] Limited    [] Decreasing   Activities:    [] Up as tolerated   [x] Bedridden    [] Specify:    SAFETY  [] 24 hr. Caregiver   [x] Side rails ? [x] Hospital bed   [x] Reviewed Falls & Safety     ALLERGIES AND MEDICATIONS     Allergies:    Allergies   Allergen Reactions    Benadryl [Diphenhydramine Hcl] Rash    Statins-Hmg-Coa Reductase Inhibitors Other (comments)     Elevated LFTS post CABG while taking med.       Sulfa (Sulfonamide Antibiotics) Rash            Visit Time In: 14:30  Visit Time Out: 15:30

## 2017-01-20 NOTE — DISCHARGE SUMMARY
Discharge Summary    [address] [place] [address]  [facility]  [phone]      Date of Admission: 1/16/2017  Date of Discharge: 1/19/2017    Lavinia Rodgers is a 67y.o. year old who was admitted to [address] [place] [address] at [facility] with a Hospice diagnosis of Sepsis; Sepsis ([facility] [address] The patient's care was focused on comfort and the patient passed away on 1/19/2017.
